# Patient Record
Sex: FEMALE | Race: WHITE | Employment: OTHER | URBAN - METROPOLITAN AREA
[De-identification: names, ages, dates, MRNs, and addresses within clinical notes are randomized per-mention and may not be internally consistent; named-entity substitution may affect disease eponyms.]

---

## 2017-02-06 ENCOUNTER — OFFICE VISIT (OUTPATIENT)
Dept: HEMATOLOGY/ONCOLOGY | Facility: HOSPITAL | Age: 62
End: 2017-02-06
Attending: INTERNAL MEDICINE
Payer: COMMERCIAL

## 2017-02-06 VITALS
BODY MASS INDEX: 36.29 KG/M2 | SYSTOLIC BLOOD PRESSURE: 134 MMHG | WEIGHT: 207.38 LBS | HEART RATE: 87 BPM | HEIGHT: 63.5 IN | OXYGEN SATURATION: 99 % | RESPIRATION RATE: 18 BRPM | DIASTOLIC BLOOD PRESSURE: 82 MMHG | TEMPERATURE: 99 F

## 2017-02-06 DIAGNOSIS — R91.1 INCIDENTAL PULMONARY NODULE, > 3MM AND < 8MM: ICD-10-CM

## 2017-02-06 DIAGNOSIS — I26.99 PULMONARY EMBOLISM ON RIGHT (HCC): Primary | ICD-10-CM

## 2017-02-06 LAB
ALBUMIN SERPL-MCNC: 3.9 G/DL (ref 3.5–4.8)
ALP LIVER SERPL-CCNC: 99 U/L (ref 46–118)
ALT SERPL-CCNC: 25 U/L (ref 14–54)
AST SERPL-CCNC: 12 U/L (ref 15–41)
BASOPHILS # BLD AUTO: 0.05 X10(3) UL (ref 0–0.1)
BASOPHILS NFR BLD AUTO: 0.8 %
BILIRUB SERPL-MCNC: 0.3 MG/DL (ref 0.1–2)
BUN BLD-MCNC: 20 MG/DL (ref 8–20)
CALCIUM BLD-MCNC: 9.8 MG/DL (ref 8.3–10.3)
CHLORIDE: 108 MMOL/L (ref 101–111)
CO2: 27 MMOL/L (ref 22–32)
CREAT BLD-MCNC: 0.74 MG/DL (ref 0.55–1.02)
EOSINOPHIL # BLD AUTO: 0.22 X10(3) UL (ref 0–0.3)
EOSINOPHIL NFR BLD AUTO: 3.4 %
ERYTHROCYTE [DISTWIDTH] IN BLOOD BY AUTOMATED COUNT: 14.5 % (ref 11.5–16)
GLUCOSE BLD-MCNC: 97 MG/DL (ref 70–99)
HCT VFR BLD AUTO: 40.5 % (ref 34–50)
HGB BLD-MCNC: 12.9 G/DL (ref 12–16)
IMMATURE GRANULOCYTE COUNT: 0.02 X10(3) UL (ref 0–1)
IMMATURE GRANULOCYTE RATIO %: 0.3 %
LYMPHOCYTES # BLD AUTO: 2.27 X10(3) UL (ref 0.9–4)
LYMPHOCYTES NFR BLD AUTO: 34.6 %
M PROTEIN MFR SERPL ELPH: 8.1 G/DL (ref 6.1–8.3)
MCH RBC QN AUTO: 28.9 PG (ref 27–33.2)
MCHC RBC AUTO-ENTMCNC: 31.9 G/DL (ref 31–37)
MCV RBC AUTO: 90.6 FL (ref 81–100)
MONOCYTES # BLD AUTO: 0.52 X10(3) UL (ref 0.1–0.6)
MONOCYTES NFR BLD AUTO: 7.9 %
NEUTROPHIL ABS PRELIM: 3.48 X10 (3) UL (ref 1.3–6.7)
NEUTROPHILS # BLD AUTO: 3.48 X10(3) UL (ref 1.3–6.7)
NEUTROPHILS NFR BLD AUTO: 53 %
PLATELET # BLD AUTO: 230 10(3)UL (ref 150–450)
POTASSIUM SERPL-SCNC: 4.2 MMOL/L (ref 3.6–5.1)
RBC # BLD AUTO: 4.47 X10(6)UL (ref 3.8–5.1)
RED CELL DISTRIBUTION WIDTH-SD: 47.9 FL (ref 35.1–46.3)
SODIUM SERPL-SCNC: 139 MMOL/L (ref 136–144)
WBC # BLD AUTO: 6.6 X10(3) UL (ref 4–13)

## 2017-02-06 PROCEDURE — 99214 OFFICE O/P EST MOD 30 MIN: CPT | Performed by: INTERNAL MEDICINE

## 2017-02-06 NOTE — PATIENT INSTRUCTIONS
For Dr. Matt Liu nurse line, call [phone] with any questions or concerns Monday through Friday 8:00 to 4:30. After hours or weekends for emergent needs [phone].

## 2017-02-06 NOTE — PROGRESS NOTES
Patient here for MD f/u. Last seen in May 2016. Taking eliquis as directed. Denies abnormal bleeding or bruising, tolerating well. C/O fatigue and generalized weakness.   States needs to f/u with PCP to adjust her synthroid dose, feels fatigued b/c of t

## 2017-02-06 NOTE — PROGRESS NOTES
Hematology Clinic Follow Up Note    Patient Name: Ronit Trejo  Medical Record Number: [id number]    YOB: 1955    PCP: Dr. Ingris Wyatt    Reason for Consultation:  Ronit Trejo was seen today for the diagnosis of pulmonary embolis Disp:  Rfl:      Allergies:   No Known Allergies    Psychosocial History:    Social History   Marital Status:   Spouse Name: N/A    Years of Education: N/A  Number of Children: N/A     Occupational History   [employer]      Social History Main Topic Results  Component Value Date   WBC 6.6 02/06/2017   WBC 6.8 05/18/2016   WBC 7.6 10/16/2015   HGB 12.9 02/06/2017   HGB 13.0 05/18/2016   HGB 12.1 10/16/2015   HCT 40.5 02/06/2017   MCV 90.6 02/06/2017   MCH 28.9 02/06/2017   MCHC 31.9 02/06/2017   RDW 14 calcified isthmus nodule could be further assessed with thyroid ultrasound as clinically appropriate. Impression & Plan:     *pulmonary embolism  -unprovoked, idiopathic.   Thrombophilia evaluation was negative.    -we have previously discussed that give

## 2017-06-29 ENCOUNTER — HOSPITAL ENCOUNTER (OUTPATIENT)
Dept: CT IMAGING | Facility: HOSPITAL | Age: 62
Discharge: HOME | End: 2017-06-29
Attending: INTERNAL MEDICINE
Payer: COMMERCIAL

## 2017-06-29 DIAGNOSIS — R91.1 INCIDENTAL PULMONARY NODULE, > 3MM AND < 8MM: ICD-10-CM

## 2017-06-29 PROCEDURE — 71260 CT THORAX DX C+: CPT | Performed by: INTERNAL MEDICINE

## 2017-06-30 ENCOUNTER — TELEPHONE (OUTPATIENT)
Dept: HEMATOLOGY/ONCOLOGY | Facility: HOSPITAL | Age: 62
End: 2017-06-30

## 2017-06-30 NOTE — TELEPHONE ENCOUNTER
Per MD:  Please call pt and let her know that her CT scan looks great, no concerns.  I will discuss further with her at her visit with me next week. Pt aware and verbalized understanding.

## 2017-07-06 ENCOUNTER — OFFICE VISIT (OUTPATIENT)
Dept: HEMATOLOGY/ONCOLOGY | Facility: HOSPITAL | Age: 62
End: 2017-07-06
Attending: INTERNAL MEDICINE
Payer: COMMERCIAL

## 2017-07-06 VITALS
RESPIRATION RATE: 18 BRPM | SYSTOLIC BLOOD PRESSURE: 116 MMHG | BODY MASS INDEX: 38.56 KG/M2 | TEMPERATURE: 97 F | HEIGHT: 63.5 IN | HEART RATE: 59 BPM | OXYGEN SATURATION: 100 % | WEIGHT: 220.38 LBS | DIASTOLIC BLOOD PRESSURE: 65 MMHG

## 2017-07-06 DIAGNOSIS — Z79.01 CHRONIC ANTICOAGULATION: ICD-10-CM

## 2017-07-06 DIAGNOSIS — I26.99 PULMONARY EMBOLISM ON RIGHT (HCC): Primary | ICD-10-CM

## 2017-07-06 DIAGNOSIS — R91.1 INCIDENTAL PULMONARY NODULE, > 3MM AND < 8MM: ICD-10-CM

## 2017-07-06 LAB
ALBUMIN SERPL-MCNC: 3.7 G/DL (ref 3.5–4.8)
ALP LIVER SERPL-CCNC: 90 U/L (ref 46–118)
ALT SERPL-CCNC: 22 U/L (ref 14–54)
AST SERPL-CCNC: 11 U/L (ref 15–41)
BASOPHILS # BLD AUTO: 0.08 X10(3) UL (ref 0–0.1)
BASOPHILS NFR BLD AUTO: 1.1 %
BILIRUB SERPL-MCNC: 0.3 MG/DL (ref 0.1–2)
BUN BLD-MCNC: 22 MG/DL (ref 8–20)
CALCIUM BLD-MCNC: 9.6 MG/DL (ref 8.3–10.3)
CHLORIDE: 110 MMOL/L (ref 101–111)
CO2: 25 MMOL/L (ref 22–32)
CREAT BLD-MCNC: 0.82 MG/DL (ref 0.55–1.02)
EOSINOPHIL # BLD AUTO: 0.23 X10(3) UL (ref 0–0.3)
EOSINOPHIL NFR BLD AUTO: 3.2 %
ERYTHROCYTE [DISTWIDTH] IN BLOOD BY AUTOMATED COUNT: 14.6 % (ref 11.5–16)
GLUCOSE BLD-MCNC: 89 MG/DL (ref 70–99)
HCT VFR BLD AUTO: 38.6 % (ref 34–50)
HGB BLD-MCNC: 12.4 G/DL (ref 12–16)
IMMATURE GRANULOCYTE COUNT: 0.03 X10(3) UL (ref 0–1)
IMMATURE GRANULOCYTE RATIO %: 0.4 %
LYMPHOCYTES # BLD AUTO: 2.53 X10(3) UL (ref 0.9–4)
LYMPHOCYTES NFR BLD AUTO: 35.5 %
M PROTEIN MFR SERPL ELPH: 8.1 G/DL (ref 6.1–8.3)
MCH RBC QN AUTO: 28.1 PG (ref 27–33.2)
MCHC RBC AUTO-ENTMCNC: 32.1 G/DL (ref 31–37)
MCV RBC AUTO: 87.3 FL (ref 81–100)
MONOCYTES # BLD AUTO: 0.48 X10(3) UL (ref 0.1–0.6)
MONOCYTES NFR BLD AUTO: 6.7 %
NEUTROPHIL ABS PRELIM: 3.77 X10 (3) UL (ref 1.3–6.7)
NEUTROPHILS # BLD AUTO: 3.77 X10(3) UL (ref 1.3–6.7)
NEUTROPHILS NFR BLD AUTO: 53.1 %
PLATELET # BLD AUTO: 215 10(3)UL (ref 150–450)
POTASSIUM SERPL-SCNC: 4.2 MMOL/L (ref 3.6–5.1)
RBC # BLD AUTO: 4.42 X10(6)UL (ref 3.8–5.1)
RED CELL DISTRIBUTION WIDTH-SD: 46.9 FL (ref 35.1–46.3)
SODIUM SERPL-SCNC: 141 MMOL/L (ref 136–144)
WBC # BLD AUTO: 7.1 X10(3) UL (ref 4–13)

## 2017-07-06 PROCEDURE — 99214 OFFICE O/P EST MOD 30 MIN: CPT | Performed by: INTERNAL MEDICINE

## 2017-07-06 NOTE — PATIENT INSTRUCTIONS
For Dr. Edmund Molina nurse line, call [phone] with any questions or concerns Monday through Friday 8:00 to 4:30. After hours or weekends for emergent needs [phone].

## 2017-07-06 NOTE — PROGRESS NOTES
Patient here for MD f/u. C/O constant fatigue, denies SOB. Occasional pain under left breast.  Taking eliquis daily. Denies abnormal bleeding/bruising.

## 2017-07-06 NOTE — PROGRESS NOTES
Hematology Clinic Follow Up Note    Patient Name: Estephania Knight  Medical Record Number: [id number]    YOB: 1955    PCP: Dr. Lele Pro    Reason for Consultation:  Estephania Knight was seen today for the diagnosis of pulmonary embolis children: N/A     Occupational History   [employer]      Social History Main Topics   Smoking status: Former Smoker  0.25 Packs/day  For 45.00 Years     Quit date: 9/23/2015    Smokeless tobacco: Never Used    Alcohol use Yes  0.0 oz/week     Comment: jane 07/06/2017   .0 07/06/2017   .0 02/06/2017   .0 05/18/2016       Lab Results  Component Value Date   GLU 89 07/06/2017   BUN 22 (H) 07/06/2017   CREATSERUM 0.82 07/06/2017   CREATSERUM 0.74 02/06/2017   CREATSERUM 0.73 05/18/2016   GFR for the first year; 5 percent per year thereafter). Further we discussed the risk for a major bleed on anticoagulation is less than 1% per year.   I advised that based on the risk:benefit ratio, that anticoagulation should be continued indefinitely at this

## 2018-01-04 ENCOUNTER — OFFICE VISIT (OUTPATIENT)
Dept: HEMATOLOGY/ONCOLOGY | Facility: HOSPITAL | Age: 63
End: 2018-01-04
Attending: INTERNAL MEDICINE
Payer: COMMERCIAL

## 2018-01-04 VITALS
OXYGEN SATURATION: 99 % | DIASTOLIC BLOOD PRESSURE: 71 MMHG | RESPIRATION RATE: 18 BRPM | TEMPERATURE: 98 F | HEIGHT: 63.5 IN | BODY MASS INDEX: 43.43 KG/M2 | SYSTOLIC BLOOD PRESSURE: 114 MMHG | WEIGHT: 248.19 LBS | HEART RATE: 61 BPM

## 2018-01-04 DIAGNOSIS — I26.99 PULMONARY EMBOLISM ON RIGHT (HCC): Primary | ICD-10-CM

## 2018-01-04 DIAGNOSIS — Z79.01 CHRONIC ANTICOAGULATION: ICD-10-CM

## 2018-01-04 LAB
ALBUMIN SERPL-MCNC: 3.7 G/DL (ref 3.5–4.8)
ALP LIVER SERPL-CCNC: 75 U/L (ref 46–118)
ALT SERPL-CCNC: 39 U/L (ref 14–54)
AST SERPL-CCNC: 27 U/L (ref 15–41)
BASOPHILS # BLD AUTO: 0.05 X10(3) UL (ref 0–0.1)
BASOPHILS NFR BLD AUTO: 0.8 %
BILIRUB SERPL-MCNC: 0.3 MG/DL (ref 0.1–2)
BUN BLD-MCNC: 17 MG/DL (ref 8–20)
CALCIUM BLD-MCNC: 9.4 MG/DL (ref 8.3–10.3)
CHLORIDE: 107 MMOL/L (ref 101–111)
CO2: 27 MMOL/L (ref 22–32)
CREAT BLD-MCNC: 0.71 MG/DL (ref 0.55–1.02)
EOSINOPHIL # BLD AUTO: 0.26 X10(3) UL (ref 0–0.3)
EOSINOPHIL NFR BLD AUTO: 4.3 %
ERYTHROCYTE [DISTWIDTH] IN BLOOD BY AUTOMATED COUNT: 14.2 % (ref 11.5–16)
GLUCOSE BLD-MCNC: 100 MG/DL (ref 70–99)
HCT VFR BLD AUTO: 37.8 % (ref 34–50)
HGB BLD-MCNC: 12.1 G/DL (ref 12–16)
IMMATURE GRANULOCYTE COUNT: 0.03 X10(3) UL (ref 0–1)
IMMATURE GRANULOCYTE RATIO %: 0.5 %
LYMPHOCYTES # BLD AUTO: 2.22 X10(3) UL (ref 0.9–4)
LYMPHOCYTES NFR BLD AUTO: 36.8 %
M PROTEIN MFR SERPL ELPH: 7.7 G/DL (ref 6.1–8.3)
MCH RBC QN AUTO: 28.5 PG (ref 27–33.2)
MCHC RBC AUTO-ENTMCNC: 32 G/DL (ref 31–37)
MCV RBC AUTO: 89.2 FL (ref 81–100)
MONOCYTES # BLD AUTO: 0.61 X10(3) UL (ref 0.1–0.6)
MONOCYTES NFR BLD AUTO: 10.1 %
NEUTROPHIL ABS PRELIM: 2.86 X10 (3) UL (ref 1.3–6.7)
NEUTROPHILS # BLD AUTO: 2.86 X10(3) UL (ref 1.3–6.7)
NEUTROPHILS NFR BLD AUTO: 47.5 %
PLATELET # BLD AUTO: 208 10(3)UL (ref 150–450)
POTASSIUM SERPL-SCNC: 4.3 MMOL/L (ref 3.6–5.1)
RBC # BLD AUTO: 4.24 X10(6)UL (ref 3.8–5.1)
RED CELL DISTRIBUTION WIDTH-SD: 46.2 FL (ref 35.1–46.3)
SODIUM SERPL-SCNC: 139 MMOL/L (ref 136–144)
WBC # BLD AUTO: 6 X10(3) UL (ref 4–13)

## 2018-01-04 PROCEDURE — 99214 OFFICE O/P EST MOD 30 MIN: CPT | Performed by: INTERNAL MEDICINE

## 2018-01-04 RX ORDER — GABAPENTIN 800 MG/1
800 TABLET ORAL 3 TIMES DAILY
COMMUNITY

## 2018-01-04 RX ORDER — OXYCODONE AND ACETAMINOPHEN 10; 325 MG/1; MG/1
1 TABLET ORAL EVERY 8 HOURS PRN
COMMUNITY

## 2018-01-04 NOTE — PROGRESS NOTES
Patient here for 6 month MD f/u. C/O increased back pain, back on Norco for.   Occasional SOB, resolves with rest.

## 2018-01-04 NOTE — PROGRESS NOTES
Hematology Clinic Follow Up Note    Patient Name: Denia Clark  Medical Record Number: [id number]    YOB: 1955    PCP: Dr. Josephina Canavan    Reason for Consultation:  Denia Clark was seen today for the diagnosis of pulmonary embolis (two) times daily. Disp: 180 tablet Rfl: 3   Levothyroxine Sodium (SYNTHROID) 150 MCG Oral Tab 175 mcg  Disp:  Rfl: 0   Meclizine HCl (ANTIVERT) 25 MG Oral Tab Take 1 tablet by mouth 4 (four) times daily as needed for Dizziness.  Disp: 20 tablet Rfl: 0 normoactive bowel sounds, no hepatosplenomegaly  Neurological: Grossly intact   Lymphatics: No palpable lymphadenopathy  Skin: no rashes or petechiae    Laboratory:    Lab Results  Component Value Date   WBC 6.0 01/04/2018   WBC 7.1 07/06/2017   WBC 6.6 02 for 3 days prior, the day of, and the day after the procedure. (hold starting 1/7, resume on 1/12).      RTC in 6 months with labs     Tonya Cabrera MD  Hematology/Medical [address] [place] [address]

## 2018-01-04 NOTE — PATIENT INSTRUCTIONS
For Dr. Mccollum Mealing nurse line, call [phone] with any questions or concerns,  Monday through Friday 8:00 to 4:30. After hours or weekends for urgent needs: [phone].   Central [phone]  Medical Records:   [phone]  [facility]

## 2018-04-15 DIAGNOSIS — I26.99 PULMONARY EMBOLISM ON RIGHT (HCC): ICD-10-CM

## 2018-04-16 RX ORDER — APIXABAN 2.5 MG/1
2.5 TABLET, FILM COATED ORAL 2 TIMES DAILY
Qty: 180 TABLET | Refills: 2 | Status: SHIPPED | OUTPATIENT
Start: 2018-04-16 | End: 2019-04-22

## 2018-04-26 ENCOUNTER — TELEPHONE (OUTPATIENT)
Dept: HEMATOLOGY/ONCOLOGY | Facility: HOSPITAL | Age: 63
End: 2018-04-26

## 2018-04-26 NOTE — TELEPHONE ENCOUNTER
MD Mahesh Bustillo, RN   Caller: Unspecified (Today,  1:09 PM)             Please advise to hold for 3 days prior, the day of, and for 3 days after her procedure.  May have to delay restarting it further if there is any surgical complica

## 2018-04-26 NOTE — TELEPHONE ENCOUNTER
Patient having surgery next week for an ankle tear repair. Patient wanting to know when it is safe to stop the Eliquis prior to the surgery and when to resume it?

## 2018-06-07 ENCOUNTER — MED REC SCAN ONLY (OUTPATIENT)
Dept: HEMATOLOGY/ONCOLOGY | Facility: HOSPITAL | Age: 63
End: 2018-06-07

## 2018-06-19 ENCOUNTER — NURSE ONLY (OUTPATIENT)
Dept: HEMATOLOGY/ONCOLOGY | Facility: HOSPITAL | Age: 63
End: 2018-06-19

## 2018-06-19 NOTE — PROGRESS NOTES
Anticoagulant holding letter re-faxed to [place] from [facility].    Faxed to [phone]

## 2018-07-09 ENCOUNTER — MED REC SCAN ONLY (OUTPATIENT)
Dept: HEMATOLOGY/ONCOLOGY | Facility: HOSPITAL | Age: 63
End: 2018-07-09

## 2019-04-22 ENCOUNTER — OFFICE VISIT (OUTPATIENT)
Dept: HEMATOLOGY/ONCOLOGY | Facility: HOSPITAL | Age: 64
End: 2019-04-22
Attending: INTERNAL MEDICINE
Payer: COMMERCIAL

## 2019-04-22 VITALS
DIASTOLIC BLOOD PRESSURE: 74 MMHG | OXYGEN SATURATION: 96 % | HEART RATE: 73 BPM | SYSTOLIC BLOOD PRESSURE: 113 MMHG | BODY MASS INDEX: 46 KG/M2 | TEMPERATURE: 98 F | WEIGHT: 265 LBS | RESPIRATION RATE: 18 BRPM

## 2019-04-22 DIAGNOSIS — R74.01 TRANSAMINITIS: ICD-10-CM

## 2019-04-22 DIAGNOSIS — I26.99 PULMONARY EMBOLISM ON RIGHT (HCC): Primary | ICD-10-CM

## 2019-04-22 DIAGNOSIS — Z79.01 CHRONIC ANTICOAGULATION: ICD-10-CM

## 2019-04-22 PROCEDURE — 99214 OFFICE O/P EST MOD 30 MIN: CPT | Performed by: INTERNAL MEDICINE

## 2019-04-22 RX ORDER — OMEPRAZOLE 20 MG/1
20 TABLET, DELAYED RELEASE ORAL DAILY
COMMUNITY

## 2019-04-22 NOTE — PROGRESS NOTES
Hematology Clinic Follow Up Note    Patient Name: Love Bangura  Medical Record Number: [id number]    YOB: 1955    PCP: Dr. Rosenberg Figures    Reason for Consultation:  Love Bangura was seen today for the diagnosis of pulmonary embolis (eight) hours as needed for Pain. Disp:  Rfl:    Levothyroxine Sodium (SYNTHROID) 150 MCG Oral Tab 175 mcg  Disp:  Rfl: 0   Meclizine HCl (ANTIVERT) 25 MG Oral Tab Take 1 tablet by mouth 4 (four) times daily as needed for Dizziness.  Disp: 20 tablet Rfl: 0 petechiae    Laboratory:  Lab Results   Component Value Date    WBC 5.7 04/22/2019    WBC 6.0 01/04/2018    WBC 7.1 07/06/2017    HGB 12.9 04/22/2019    HGB 12.1 01/04/2018    HGB 12.4 07/06/2017    HCT 39.3 04/22/2019    MCV 89.1 04/22/2019    MCH 29.3 04 likely related to weight gain. I encouraged her to work on weight loss and exercise. -repeat CMP in 6 months    *weight gain  -she feels this is due to her inability to exercise due to back and joint pains.   I encouraged her to try water aerobics or oth

## 2019-04-22 NOTE — PROGRESS NOTES
Education Record    Learner:  Patient and Family Member    Disease / Diagnosis:PE    Barriers / Limitations:  None   Comments:    Method:  Brief focused   Comments:    General Topics:  Plan of care reviewed   Comments:    Outcome:  Shows understanding   Co

## 2019-04-23 PROBLEM — M47.812 CERVICAL SPINE DEGENERATION: Status: ACTIVE | Noted: 2018-10-29

## 2019-04-23 PROBLEM — R74.01 TRANSAMINITIS: Status: ACTIVE | Noted: 2019-04-23

## 2019-07-19 ENCOUNTER — TELEPHONE (OUTPATIENT)
Dept: HEMATOLOGY/ONCOLOGY | Facility: HOSPITAL | Age: 64
End: 2019-07-19

## 2019-07-19 NOTE — TELEPHONE ENCOUNTER
Office called LVM that they faxed hold of eliquis request on 7/2 and have not received form back. Pt has upcoming procedure. Called office back to give verbal from MD hold 72 hrs before and 2 days after. Office phone just rang and rang not able to LVM.

## 2020-01-13 ENCOUNTER — TELEPHONE (OUTPATIENT)
Dept: HEMATOLOGY/ONCOLOGY | Facility: HOSPITAL | Age: 65
End: 2020-01-13

## 2020-01-13 DIAGNOSIS — I26.99 PULMONARY EMBOLISM ON RIGHT (HCC): ICD-10-CM

## 2020-01-13 NOTE — TELEPHONE ENCOUNTER
Called M for pt to call back office. Received refill request for eliquis from pharmacy. Was due back to see MD in Oct. 2019. Will refill for 30day supply but pt needs to make f/up apt.

## 2020-02-19 ENCOUNTER — TELEPHONE (OUTPATIENT)
Dept: HEMATOLOGY/ONCOLOGY | Facility: HOSPITAL | Age: 65
End: 2020-02-19

## 2020-02-19 DIAGNOSIS — I26.99 PULMONARY EMBOLISM ON RIGHT (HCC): ICD-10-CM

## 2020-02-19 NOTE — TELEPHONE ENCOUNTER
received fax from pharmacy for refill of eliquis. Have left pt multiple message about being late for f/up and only filling meds for 1 month. She was due for f/up in 10/2019. Told pt she needs to have apt torsten before can refill medication. She v/u.  Henrique Painting

## 2020-02-24 ENCOUNTER — APPOINTMENT (OUTPATIENT)
Dept: HEMATOLOGY/ONCOLOGY | Facility: HOSPITAL | Age: 65
End: 2020-02-24
Attending: INTERNAL MEDICINE
Payer: COMMERCIAL

## 2020-03-05 ENCOUNTER — OFFICE VISIT (OUTPATIENT)
Dept: HEMATOLOGY/ONCOLOGY | Facility: HOSPITAL | Age: 65
End: 2020-03-05
Attending: INTERNAL MEDICINE
Payer: COMMERCIAL

## 2020-03-05 VITALS
SYSTOLIC BLOOD PRESSURE: 129 MMHG | HEIGHT: 63.5 IN | DIASTOLIC BLOOD PRESSURE: 74 MMHG | TEMPERATURE: 98 F | WEIGHT: 272 LBS | HEART RATE: 60 BPM | BODY MASS INDEX: 47.6 KG/M2 | RESPIRATION RATE: 16 BRPM | OXYGEN SATURATION: 96 %

## 2020-03-05 DIAGNOSIS — R74.01 TRANSAMINITIS: ICD-10-CM

## 2020-03-05 DIAGNOSIS — Z79.01 CHRONIC ANTICOAGULATION: ICD-10-CM

## 2020-03-05 DIAGNOSIS — I26.99 PULMONARY EMBOLISM ON RIGHT (HCC): Primary | ICD-10-CM

## 2020-03-05 LAB
ALBUMIN SERPL-MCNC: 3.5 G/DL (ref 3.4–5)
ALBUMIN/GLOB SERPL: 0.9 {RATIO} (ref 1–2)
ALP LIVER SERPL-CCNC: 94 U/L (ref 50–130)
ALT SERPL-CCNC: 93 U/L (ref 13–56)
ANION GAP SERPL CALC-SCNC: 3 MMOL/L (ref 0–18)
AST SERPL-CCNC: 50 U/L (ref 15–37)
BASOPHILS # BLD AUTO: 0.05 X10(3) UL (ref 0–0.2)
BASOPHILS NFR BLD AUTO: 0.8 %
BILIRUB SERPL-MCNC: 0.3 MG/DL (ref 0.1–2)
BUN BLD-MCNC: 12 MG/DL (ref 7–18)
BUN/CREAT SERPL: 13.6 (ref 10–20)
CALCIUM BLD-MCNC: 9.4 MG/DL (ref 8.5–10.1)
CHLORIDE SERPL-SCNC: 107 MMOL/L (ref 98–112)
CO2 SERPL-SCNC: 27 MMOL/L (ref 21–32)
CREAT BLD-MCNC: 0.88 MG/DL (ref 0.55–1.02)
DEPRECATED RDW RBC AUTO: 46.1 FL (ref 35.1–46.3)
EOSINOPHIL # BLD AUTO: 0.17 X10(3) UL (ref 0–0.7)
EOSINOPHIL NFR BLD AUTO: 2.8 %
ERYTHROCYTE [DISTWIDTH] IN BLOOD BY AUTOMATED COUNT: 14.4 % (ref 11–15)
GLOBULIN PLAS-MCNC: 4.1 G/DL (ref 2.8–4.4)
GLUCOSE BLD-MCNC: 94 MG/DL (ref 70–99)
HCT VFR BLD AUTO: 39 % (ref 35–48)
HGB BLD-MCNC: 12.6 G/DL (ref 12–16)
IMM GRANULOCYTES # BLD AUTO: 0.03 X10(3) UL (ref 0–1)
IMM GRANULOCYTES NFR BLD: 0.5 %
LYMPHOCYTES # BLD AUTO: 2.43 X10(3) UL (ref 1–4)
LYMPHOCYTES NFR BLD AUTO: 40.2 %
M PROTEIN MFR SERPL ELPH: 7.6 G/DL (ref 6.4–8.2)
MCH RBC QN AUTO: 28.5 PG (ref 26–34)
MCHC RBC AUTO-ENTMCNC: 32.3 G/DL (ref 31–37)
MCV RBC AUTO: 88.2 FL (ref 80–100)
MONOCYTES # BLD AUTO: 0.47 X10(3) UL (ref 0.1–1)
MONOCYTES NFR BLD AUTO: 7.8 %
NEUTROPHILS # BLD AUTO: 2.9 X10 (3) UL (ref 1.5–7.7)
NEUTROPHILS # BLD AUTO: 2.9 X10(3) UL (ref 1.5–7.7)
NEUTROPHILS NFR BLD AUTO: 47.9 %
OSMOLALITY SERPL CALC.SUM OF ELEC: 284 MOSM/KG (ref 275–295)
PLATELET # BLD AUTO: 199 10(3)UL (ref 150–450)
POTASSIUM SERPL-SCNC: 4.3 MMOL/L (ref 3.5–5.1)
RBC # BLD AUTO: 4.42 X10(6)UL (ref 3.8–5.3)
SODIUM SERPL-SCNC: 137 MMOL/L (ref 136–145)
WBC # BLD AUTO: 6.1 X10(3) UL (ref 4–11)

## 2020-03-05 PROCEDURE — 99214 OFFICE O/P EST MOD 30 MIN: CPT | Performed by: INTERNAL MEDICINE

## 2020-03-05 NOTE — PROGRESS NOTES
Education Record    Learner:  Patient    Disease / Diagnosis:PE    Barriers / Limitations:  None   Comments:    Method:  Brief focused   Comments:    General Topics:  Plan of care reviewed   Comments:    Outcome:  Shows understanding   Comments:here for la

## 2020-03-05 NOTE — PROGRESS NOTES
Hematology Clinic Follow Up Note    Patient Name: Love Bangura  Medical Record Number: [id number]    YOB: 1955    PCP: Dr. Chet Chan    Reason for Consultation:  Love Bangura was seen today for the diagnosis of pulmonary embolis (eight) hours as needed for Pain., Disp: , Rfl:   Levothyroxine Sodium (SYNTHROID) 150 MCG Oral Tab, 175 mcg , Disp: , Rfl: 0  Meclizine HCl (ANTIVERT) 25 MG Oral Tab, Take 1 tablet by mouth 4 (four) times daily as needed for Dizziness. , Disp: 20 tablet, R normoactive bowel sounds, no hepatosplenomegaly  Neurological: Grossly intact   Lymphatics: No palpable lymphadenopathy  Skin: no rashes or petechiae  Ext: trace symmetric BLE edema    Laboratory:  Lab Results   Component Value Date    WBC 6.1 03/05/2020

## 2020-12-14 DIAGNOSIS — I26.99 PULMONARY EMBOLISM ON RIGHT (HCC): ICD-10-CM

## 2020-12-14 RX ORDER — APIXABAN 2.5 MG/1
TABLET, FILM COATED ORAL
Qty: 60 TABLET | Refills: 0 | Status: SHIPPED | OUTPATIENT
Start: 2020-12-14 | End: 2021-02-08

## 2021-02-08 DIAGNOSIS — I26.99 PULMONARY EMBOLISM ON RIGHT (HCC): ICD-10-CM

## 2021-02-08 NOTE — TELEPHONE ENCOUNTER
Rob Andujar calling to make f/u appt and request refill on ELIQUIS 2.5 MG Oral Tab.  Thank you ortega

## 2021-02-15 ENCOUNTER — APPOINTMENT (OUTPATIENT)
Dept: HEMATOLOGY/ONCOLOGY | Facility: HOSPITAL | Age: 66
End: 2021-02-15
Attending: INTERNAL MEDICINE
Payer: COMMERCIAL

## 2021-02-25 ENCOUNTER — OFFICE VISIT (OUTPATIENT)
Dept: HEMATOLOGY/ONCOLOGY | Facility: HOSPITAL | Age: 66
End: 2021-02-25
Attending: INTERNAL MEDICINE
Payer: MEDICARE

## 2021-02-25 VITALS
DIASTOLIC BLOOD PRESSURE: 87 MMHG | SYSTOLIC BLOOD PRESSURE: 146 MMHG | TEMPERATURE: 98 F | RESPIRATION RATE: 18 BRPM | WEIGHT: 285 LBS | HEART RATE: 72 BPM | BODY MASS INDEX: 50 KG/M2 | OXYGEN SATURATION: 94 %

## 2021-02-25 DIAGNOSIS — Z79.01 PULMONARY EMBOLISM ON LONG-TERM ANTICOAGULATION THERAPY (HCC): Primary | ICD-10-CM

## 2021-02-25 DIAGNOSIS — I26.99 PULMONARY EMBOLISM ON RIGHT (HCC): ICD-10-CM

## 2021-02-25 DIAGNOSIS — I26.99 PULMONARY EMBOLISM ON LONG-TERM ANTICOAGULATION THERAPY (HCC): Primary | ICD-10-CM

## 2021-02-25 DIAGNOSIS — Z79.01 CHRONIC ANTICOAGULATION: ICD-10-CM

## 2021-02-25 LAB
ALBUMIN SERPL-MCNC: 3.8 G/DL (ref 3.4–5)
ALBUMIN/GLOB SERPL: 0.9 {RATIO} (ref 1–2)
ALP LIVER SERPL-CCNC: 83 U/L
ALT SERPL-CCNC: 45 U/L
ANION GAP SERPL CALC-SCNC: 5 MMOL/L (ref 0–18)
AST SERPL-CCNC: 31 U/L (ref 15–37)
BASOPHILS # BLD AUTO: 0.06 X10(3) UL (ref 0–0.2)
BASOPHILS NFR BLD AUTO: 0.9 %
BILIRUB SERPL-MCNC: 0.3 MG/DL (ref 0.1–2)
BUN BLD-MCNC: 12 MG/DL (ref 7–18)
BUN/CREAT SERPL: 15.4 (ref 10–20)
CALCIUM BLD-MCNC: 9.6 MG/DL (ref 8.5–10.1)
CHLORIDE SERPL-SCNC: 106 MMOL/L (ref 98–112)
CO2 SERPL-SCNC: 27 MMOL/L (ref 21–32)
CREAT BLD-MCNC: 0.78 MG/DL
DEPRECATED RDW RBC AUTO: 48 FL (ref 35.1–46.3)
EOSINOPHIL # BLD AUTO: 0.2 X10(3) UL (ref 0–0.7)
EOSINOPHIL NFR BLD AUTO: 3.1 %
ERYTHROCYTE [DISTWIDTH] IN BLOOD BY AUTOMATED COUNT: 14.1 % (ref 11–15)
GLOBULIN PLAS-MCNC: 4.2 G/DL (ref 2.8–4.4)
GLUCOSE BLD-MCNC: 140 MG/DL (ref 70–99)
HCT VFR BLD AUTO: 40.4 %
HGB BLD-MCNC: 12.7 G/DL
IMM GRANULOCYTES # BLD AUTO: 0.03 X10(3) UL (ref 0–1)
IMM GRANULOCYTES NFR BLD: 0.5 %
LYMPHOCYTES # BLD AUTO: 2.17 X10(3) UL (ref 1–4)
LYMPHOCYTES NFR BLD AUTO: 34.1 %
M PROTEIN MFR SERPL ELPH: 8 G/DL (ref 6.4–8.2)
MCH RBC QN AUTO: 29.2 PG (ref 26–34)
MCHC RBC AUTO-ENTMCNC: 31.4 G/DL (ref 31–37)
MCV RBC AUTO: 92.9 FL
MONOCYTES # BLD AUTO: 0.58 X10(3) UL (ref 0.1–1)
MONOCYTES NFR BLD AUTO: 9.1 %
NEUTROPHILS # BLD AUTO: 3.32 X10 (3) UL (ref 1.5–7.7)
NEUTROPHILS # BLD AUTO: 3.32 X10(3) UL (ref 1.5–7.7)
NEUTROPHILS NFR BLD AUTO: 52.3 %
OSMOLALITY SERPL CALC.SUM OF ELEC: 288 MOSM/KG (ref 275–295)
PATIENT FASTING Y/N/NP: NO
PLATELET # BLD AUTO: 215 10(3)UL (ref 150–450)
POTASSIUM SERPL-SCNC: 4.5 MMOL/L (ref 3.5–5.1)
RBC # BLD AUTO: 4.35 X10(6)UL
SODIUM SERPL-SCNC: 138 MMOL/L (ref 136–145)
WBC # BLD AUTO: 6.4 X10(3) UL (ref 4–11)

## 2021-02-25 PROCEDURE — 99214 OFFICE O/P EST MOD 30 MIN: CPT | Performed by: INTERNAL MEDICINE

## 2021-02-25 NOTE — PROGRESS NOTES
Hematology Clinic Follow Up Note    Patient Name: Natalia Parikh  Medical Record Number: [id number]    YOB: 1955    PCP: Dr. Javid Ramirez    Reason for Consultation:  Natalia Parikh was seen today for the diagnosis of pulmonary embolis mouth 3 (three) times daily. , Disp: , Rfl:     •  oxyCODONE-acetaminophen  MG Oral Tab, Take 1 tablet by mouth every 8 (eight) hours as needed for Pain., Disp: , Rfl:     •  Levothyroxine Sodium (SYNTHROID) 150 MCG Oral Tab, 175 mcg , Disp: , Rfl: 0 sounds, no hepatosplenomegaly  Neurological: Grossly intact   Lymphatics: No palpable lymphadenopathy  Skin: no rashes or petechiae  Ext: trace symmetric BLE edema    Laboratory:  Lab Results   Component Value Date    WBC 6.4 02/25/2021    WBC 6.1 03/05/20

## 2021-02-25 NOTE — PROGRESS NOTES
Education Record    Learner:  Patient    Disease / Diagnosis:PE    Barriers / Limitations:  None   Comments:    Method:  Discussion   Comments:    General Topics:  Plan of care reviewed   Comments:    Outcome:  Shows understanding   Comments:last seen 1 ye

## 2021-02-26 PROBLEM — Z79.01 PULMONARY EMBOLISM ON LONG-TERM ANTICOAGULATION THERAPY (HCC): Status: ACTIVE | Noted: 2021-02-26

## 2021-02-26 PROBLEM — I26.99 PULMONARY EMBOLISM ON LONG-TERM ANTICOAGULATION THERAPY  (HCC): Status: ACTIVE | Noted: 2021-02-26

## 2021-02-26 PROBLEM — I26.99 PULMONARY EMBOLISM ON LONG-TERM ANTICOAGULATION THERAPY (HCC): Status: ACTIVE | Noted: 2021-02-26

## 2021-02-26 PROBLEM — I26.99 PULMONARY EMBOLISM ON LONG-TERM ANTICOAGULATION THERAPY: Status: ACTIVE | Noted: 2021-02-26

## 2021-02-26 PROBLEM — Z79.01 PULMONARY EMBOLISM ON LONG-TERM ANTICOAGULATION THERAPY: Status: ACTIVE | Noted: 2021-02-26

## 2021-02-26 PROBLEM — Z79.01 PULMONARY EMBOLISM ON LONG-TERM ANTICOAGULATION THERAPY  (HCC): Status: ACTIVE | Noted: 2021-02-26

## 2021-08-26 ENCOUNTER — OFFICE VISIT (OUTPATIENT)
Dept: HEMATOLOGY/ONCOLOGY | Facility: HOSPITAL | Age: 66
End: 2021-08-26
Attending: INTERNAL MEDICINE
Payer: MEDICARE

## 2021-08-26 VITALS
BODY MASS INDEX: 50 KG/M2 | DIASTOLIC BLOOD PRESSURE: 82 MMHG | RESPIRATION RATE: 18 BRPM | OXYGEN SATURATION: 97 % | SYSTOLIC BLOOD PRESSURE: 135 MMHG | WEIGHT: 284 LBS | HEART RATE: 80 BPM | TEMPERATURE: 98 F

## 2021-08-26 DIAGNOSIS — Z79.01 CHRONIC ANTICOAGULATION: ICD-10-CM

## 2021-08-26 DIAGNOSIS — Z71.89 COUNSELED ABOUT COVID-19 VIRUS INFECTION: ICD-10-CM

## 2021-08-26 DIAGNOSIS — Z79.01 PULMONARY EMBOLISM ON LONG-TERM ANTICOAGULATION THERAPY (HCC): Primary | ICD-10-CM

## 2021-08-26 DIAGNOSIS — I26.99 PULMONARY EMBOLISM ON RIGHT (HCC): ICD-10-CM

## 2021-08-26 DIAGNOSIS — I26.99 PULMONARY EMBOLISM ON LONG-TERM ANTICOAGULATION THERAPY (HCC): Primary | ICD-10-CM

## 2021-08-26 LAB
ALBUMIN SERPL-MCNC: 3.4 G/DL (ref 3.4–5)
ALBUMIN/GLOB SERPL: 0.8 {RATIO} (ref 1–2)
ALP LIVER SERPL-CCNC: 79 U/L
ALT SERPL-CCNC: 47 U/L
ANION GAP SERPL CALC-SCNC: 2 MMOL/L (ref 0–18)
AST SERPL-CCNC: 27 U/L (ref 15–37)
BASOPHILS # BLD AUTO: 0.08 X10(3) UL (ref 0–0.2)
BASOPHILS NFR BLD AUTO: 1.2 %
BILIRUB SERPL-MCNC: 0.2 MG/DL (ref 0.1–2)
BUN BLD-MCNC: 13 MG/DL (ref 7–18)
CALCIUM BLD-MCNC: 9.5 MG/DL (ref 8.5–10.1)
CHLORIDE SERPL-SCNC: 108 MMOL/L (ref 98–112)
CO2 SERPL-SCNC: 27 MMOL/L (ref 21–32)
CREAT BLD-MCNC: 0.92 MG/DL
EOSINOPHIL # BLD AUTO: 0.28 X10(3) UL (ref 0–0.7)
EOSINOPHIL NFR BLD AUTO: 4.1 %
ERYTHROCYTE [DISTWIDTH] IN BLOOD BY AUTOMATED COUNT: 14.4 %
GLOBULIN PLAS-MCNC: 4.3 G/DL (ref 2.8–4.4)
GLUCOSE BLD-MCNC: 110 MG/DL (ref 70–99)
HCT VFR BLD AUTO: 39.6 %
HGB BLD-MCNC: 12.7 G/DL
IMM GRANULOCYTES # BLD AUTO: 0.04 X10(3) UL (ref 0–1)
IMM GRANULOCYTES NFR BLD: 0.6 %
LYMPHOCYTES # BLD AUTO: 2.34 X10(3) UL (ref 1–4)
LYMPHOCYTES NFR BLD AUTO: 34.5 %
M PROTEIN MFR SERPL ELPH: 7.7 G/DL (ref 6.4–8.2)
MCH RBC QN AUTO: 29 PG (ref 26–34)
MCHC RBC AUTO-ENTMCNC: 32.1 G/DL (ref 31–37)
MCV RBC AUTO: 90.4 FL
MONOCYTES # BLD AUTO: 0.68 X10(3) UL (ref 0.1–1)
MONOCYTES NFR BLD AUTO: 10 %
NEUTROPHILS # BLD AUTO: 3.37 X10 (3) UL (ref 1.5–7.7)
NEUTROPHILS # BLD AUTO: 3.37 X10(3) UL (ref 1.5–7.7)
NEUTROPHILS NFR BLD AUTO: 49.6 %
OSMOLALITY SERPL CALC.SUM OF ELEC: 285 MOSM/KG (ref 275–295)
PATIENT FASTING Y/N/NP: NO
PLATELET # BLD AUTO: 236 10(3)UL (ref 150–450)
POTASSIUM SERPL-SCNC: 4.6 MMOL/L (ref 3.5–5.1)
RBC # BLD AUTO: 4.38 X10(6)UL
SODIUM SERPL-SCNC: 137 MMOL/L (ref 136–145)
WBC # BLD AUTO: 6.8 X10(3) UL (ref 4–11)

## 2021-08-26 PROCEDURE — 99215 OFFICE O/P EST HI 40 MIN: CPT | Performed by: INTERNAL MEDICINE

## 2021-08-26 NOTE — PROGRESS NOTES
Education Record     Learner:  Patient     Disease / Diagnosis:PE     Barriers / Limitations:  None                Comments:     Method:  Discussion                Comments:     General Topics:  Plan of care reviewed                Comments:     Outcome:

## 2021-08-26 NOTE — PROGRESS NOTES
Hematology Clinic Follow Up Note    Patient Name: Estephania Knight  Medical Record Number: [id number]   YOB: 1955    PCP: Dr. Lele Pro    Reason for Consultation:  Estephania Knight was seen today for the diagnosis of pulmonary embolism (PRILOSEC OTC) 20 MG Oral Tab EC, Take 20 mg by mouth daily. , Disp: , Rfl:   gabapentin 800 MG Oral Tab, Take 800 mg by mouth 3 (three) times daily. , Disp: , Rfl:   oxyCODONE-acetaminophen  MG Oral Tab, Take 1 tablet by mouth every 8 (eight) hours as murmurs  Respiratory: Lungs clear to auscultation bilaterally  GI/Abd: Soft, non-tender with normoactive bowel sounds, no hepatosplenomegaly  Neurological: Grossly intact   Lymphatics: No palpable lymphadenopathy  Skin: no rashes or petechiae  Ext: trace s far.  -Today we discussed at length the benefits and risks of the COVID-19 vaccination. Advised that I strongly recommend she get one of the mRNA based COVID-19 vaccines. We discussed that the benefits dramatically outweigh any risk.   We also discussed t

## 2022-02-28 ENCOUNTER — OFFICE VISIT (OUTPATIENT)
Dept: HEMATOLOGY/ONCOLOGY | Facility: HOSPITAL | Age: 67
End: 2022-02-28
Attending: INTERNAL MEDICINE
Payer: MEDICARE

## 2022-02-28 VITALS
HEART RATE: 55 BPM | BODY MASS INDEX: 49 KG/M2 | OXYGEN SATURATION: 96 % | WEIGHT: 283.5 LBS | SYSTOLIC BLOOD PRESSURE: 131 MMHG | DIASTOLIC BLOOD PRESSURE: 77 MMHG | RESPIRATION RATE: 18 BRPM | TEMPERATURE: 98 F

## 2022-02-28 DIAGNOSIS — I26.99 PULMONARY EMBOLISM ON RIGHT (HCC): ICD-10-CM

## 2022-02-28 DIAGNOSIS — Z79.01 PULMONARY EMBOLISM ON LONG-TERM ANTICOAGULATION THERAPY (HCC): Primary | ICD-10-CM

## 2022-02-28 DIAGNOSIS — Z79.01 CHRONIC ANTICOAGULATION: ICD-10-CM

## 2022-02-28 DIAGNOSIS — I26.99 PULMONARY EMBOLISM ON LONG-TERM ANTICOAGULATION THERAPY (HCC): Primary | ICD-10-CM

## 2022-02-28 LAB
ALBUMIN SERPL-MCNC: 3.6 G/DL (ref 3.4–5)
ALBUMIN/GLOB SERPL: 0.8 {RATIO} (ref 1–2)
ALP LIVER SERPL-CCNC: 75 U/L
ALT SERPL-CCNC: 34 U/L
ANION GAP SERPL CALC-SCNC: 3 MMOL/L (ref 0–18)
AST SERPL-CCNC: 23 U/L (ref 15–37)
BASOPHILS # BLD AUTO: 0.07 X10(3) UL (ref 0–0.2)
BASOPHILS NFR BLD AUTO: 1.1 %
BILIRUB SERPL-MCNC: 0.4 MG/DL (ref 0.1–2)
BUN BLD-MCNC: 9 MG/DL (ref 7–18)
CALCIUM BLD-MCNC: 9.9 MG/DL (ref 8.5–10.1)
CHLORIDE SERPL-SCNC: 107 MMOL/L (ref 98–112)
CO2 SERPL-SCNC: 29 MMOL/L (ref 21–32)
CREAT BLD-MCNC: 0.9 MG/DL
EOSINOPHIL # BLD AUTO: 0.17 X10(3) UL (ref 0–0.7)
EOSINOPHIL NFR BLD AUTO: 2.6 %
ERYTHROCYTE [DISTWIDTH] IN BLOOD BY AUTOMATED COUNT: 14.6 %
FASTING STATUS PATIENT QL REPORTED: NO
GLOBULIN PLAS-MCNC: 4.5 G/DL (ref 2.8–4.4)
GLUCOSE BLD-MCNC: 122 MG/DL (ref 70–99)
HCT VFR BLD AUTO: 39.3 %
HGB BLD-MCNC: 12.6 G/DL
IMM GRANULOCYTES # BLD AUTO: 0.06 X10(3) UL (ref 0–1)
IMM GRANULOCYTES NFR BLD: 0.9 %
LYMPHOCYTES # BLD AUTO: 2.15 X10(3) UL (ref 1–4)
LYMPHOCYTES NFR BLD AUTO: 33.1 %
MCH RBC QN AUTO: 28.6 PG (ref 26–34)
MCHC RBC AUTO-ENTMCNC: 32.1 G/DL (ref 31–37)
MCV RBC AUTO: 89.3 FL
MONOCYTES # BLD AUTO: 0.67 X10(3) UL (ref 0.1–1)
MONOCYTES NFR BLD AUTO: 10.3 %
NEUTROPHILS # BLD AUTO: 3.38 X10 (3) UL (ref 1.5–7.7)
NEUTROPHILS # BLD AUTO: 3.38 X10(3) UL (ref 1.5–7.7)
NEUTROPHILS NFR BLD AUTO: 52 %
OSMOLALITY SERPL CALC.SUM OF ELEC: 288 MOSM/KG (ref 275–295)
PLATELET # BLD AUTO: 206 10(3)UL (ref 150–450)
POTASSIUM SERPL-SCNC: 4.9 MMOL/L (ref 3.5–5.1)
PROT SERPL-MCNC: 8.1 G/DL (ref 6.4–8.2)
RBC # BLD AUTO: 4.4 X10(6)UL
SODIUM SERPL-SCNC: 139 MMOL/L (ref 136–145)
WBC # BLD AUTO: 6.5 X10(3) UL (ref 4–11)

## 2022-02-28 PROCEDURE — 99214 OFFICE O/P EST MOD 30 MIN: CPT | Performed by: INTERNAL MEDICINE

## 2022-08-29 ENCOUNTER — APPOINTMENT (OUTPATIENT)
Dept: HEMATOLOGY/ONCOLOGY | Facility: HOSPITAL | Age: 67
End: 2022-08-29
Attending: INTERNAL MEDICINE
Payer: MEDICARE

## 2022-08-29 ENCOUNTER — TELEPHONE (OUTPATIENT)
Dept: HEMATOLOGY/ONCOLOGY | Facility: HOSPITAL | Age: 67
End: 2022-08-29

## 2022-08-31 ENCOUNTER — TELEPHONE (OUTPATIENT)
Dept: HEMATOLOGY/ONCOLOGY | Facility: HOSPITAL | Age: 67
End: 2022-08-31

## 2022-09-19 ENCOUNTER — TELEPHONE (OUTPATIENT)
Dept: HEMATOLOGY/ONCOLOGY | Facility: HOSPITAL | Age: 67
End: 2022-09-19

## 2023-05-26 ENCOUNTER — TELEPHONE (OUTPATIENT)
Dept: HEMATOLOGY/ONCOLOGY | Facility: HOSPITAL | Age: 68
End: 2023-05-26

## 2023-05-26 NOTE — TELEPHONE ENCOUNTER
Spoke with pt. Over year due for f.up. needs apt before will refill medication. She v/u. Transferred to [facility] to [facility].

## 2023-06-12 ENCOUNTER — APPOINTMENT (OUTPATIENT)
Dept: HEMATOLOGY/ONCOLOGY | Facility: HOSPITAL | Age: 68
End: 2023-06-12
Attending: INTERNAL MEDICINE
Payer: MEDICARE

## 2023-06-12 ENCOUNTER — TELEPHONE (OUTPATIENT)
Dept: HEMATOLOGY/ONCOLOGY | Facility: HOSPITAL | Age: 68
End: 2023-06-12

## 2023-06-12 NOTE — TELEPHONE ENCOUNTER
This patient just called a second ago to cancel her appointment with Dr. Ji Grady because her dog is bleeding and she have to take him to the [facility]. She had a 900 AM appointment today. She will call back to reschedule.

## 2024-02-06 ENCOUNTER — TELEPHONE (OUTPATIENT)
Dept: HEMATOLOGY/ONCOLOGY | Facility: HOSPITAL | Age: 69
End: 2024-02-06

## 2024-02-06 NOTE — TELEPHONE ENCOUNTER
Dr. Cavazos  [facility] [phone]  Giuliana is going to do the Spinal Cord  Stimulation Trail that last 7 days. She is taking Eliquis that she needs to stop 3days prior to the trail starting and also to be off for 7 days during the trail for a total of 7 days. She would need this to be ok with Dr. Butts. Could we also get a letter faxed over This would be ok. Fax [phone]. Thanks Amelia

## 2024-02-06 NOTE — TELEPHONE ENCOUNTER
Spoke with christina and informed pt has not been seen in office for two years and has not gotten her eliquis filled from our office. She would need an appointment for clearance. Christina v/becki and will call pt.

## 2024-02-07 ENCOUNTER — TELEPHONE (OUTPATIENT)
Dept: SURGERY | Facility: CLINIC | Age: 69
End: 2024-02-07

## 2024-02-07 NOTE — TELEPHONE ENCOUNTER
[facility] office received referral requisition of this patient for Dr. Chuy Farmer from [facility]. The referral has been faxed to Vern's office ([phone]).

## 2024-02-09 DIAGNOSIS — I26.99 PULMONARY EMBOLISM ON RIGHT (HCC): ICD-10-CM

## 2024-02-09 NOTE — TELEPHONE ENCOUNTER
Patient need a refill on her Eliquis 5 mg, she is almost out of medication,   Please send to [facility] in [place] on [address].

## 2024-02-20 ENCOUNTER — OFFICE VISIT (OUTPATIENT)
Dept: SURGERY | Facility: CLINIC | Age: 69
End: 2024-02-20
Payer: MEDICARE

## 2024-02-20 ENCOUNTER — TELEPHONE (OUTPATIENT)
Dept: SURGERY | Facility: CLINIC | Age: 69
End: 2024-02-20

## 2024-02-20 VITALS
BODY MASS INDEX: 47.84 KG/M2 | HEART RATE: 61 BPM | SYSTOLIC BLOOD PRESSURE: 128 MMHG | HEIGHT: 63 IN | WEIGHT: 270 LBS | DIASTOLIC BLOOD PRESSURE: 83 MMHG

## 2024-02-20 DIAGNOSIS — M54.16 LUMBAR RADICULOPATHY: ICD-10-CM

## 2024-02-20 DIAGNOSIS — M48.061 SPINAL STENOSIS OF LUMBAR REGION WITHOUT NEUROGENIC CLAUDICATION: Primary | ICD-10-CM

## 2024-02-20 DIAGNOSIS — Z98.1 STATUS POST LUMBAR SPINAL FUSION: ICD-10-CM

## 2024-02-20 PROCEDURE — 99205 OFFICE O/P NEW HI 60 MIN: CPT | Performed by: NEUROLOGICAL SURGERY

## 2024-02-20 RX ORDER — CLOTRIMAZOLE AND BETAMETHASONE DIPROPIONATE 10; .64 MG/G; MG/G
1 CREAM TOPICAL 3 TIMES DAILY
COMMUNITY
Start: 2023-09-25

## 2024-02-20 RX ORDER — METHYLPREDNISOLONE 4 MG/1
TABLET ORAL SEE ADMIN INSTRUCTIONS
COMMUNITY
Start: 2023-11-22

## 2024-02-20 RX ORDER — OXYCODONE HYDROCHLORIDE 10 MG/1
10 TABLET ORAL 4 TIMES DAILY PRN
COMMUNITY
Start: 2023-10-27

## 2024-02-20 RX ORDER — OXYCODONE 13.5 MG/1
1 CAPSULE, EXTENDED RELEASE ORAL 2 TIMES DAILY
COMMUNITY
Start: 2023-12-20

## 2024-02-20 NOTE — PROGRESS NOTES
[facility]        [facility]      [address]  [address]  [place] [address]    PHONE  [phone]          FAX  [phone]    [email or web address]      OFFICE CONSULTATION          Giuliana Flanagan  : 10/6/1955   MRN: [id number]    PCP: Kaylene Powell DO  Referring Provider: No ref. provider found     Insurance: Payor: MEDICARE / Plan: MEDICARE PART B ONLY / Product Type: *No Product type* /            Date of Consult:  2024    Reason for Consultation:  Our patient has been referred to our office for evaluation of:  Lower back pain    History of Present Illness:  Giuliana Flanagan is a a(n) 68 year old, female.  Our patient presents with significant lower back pain that has a longstanding history.  She underwent an L4-5 interbody fusion in the past after which she did well but then continued to have significant lower back pain.  She also reports chronic lower extremity numbness.  She is attempted multiple epidural steroid injections, physical therapy as well as oral analgesics without any significant improvement.  She denies any bowel or bladder changes.        History:  Past Medical History:   Diagnosis Date    Chronic back pain     GERD (gastroesophageal reflux disease)     Hypothyroidism     Incidental pulmonary nodule, > 3mm and < 8mm 2015    Obesity     SANG on CPAP     PE (pulmonary embolism)     Pulmonary embolism on right (HCC) 2015      Past Surgical History:   Procedure Laterality Date    BACK SURGERY      L3-4 fusion          x 2    CHOLECYSTECTOMY      HYSTERECTOMY       Family History   Problem Relation Age of Onset    Bleeding Disorders Neg     Blood Disorder Neg     Cancer Neg       Social History     Socioeconomic History    Marital status:      Spouse name: Not on file    Number of children: Not on file    Years of education: Not on file    Highest education level: Not  on file   Occupational History     Employer: [employer]   Tobacco Use    Smoking status: Former     Packs/day: 0.25     Years: 45.00     Additional pack years: 0.00     Total pack years: 11.25     Types: Cigarettes     Quit date: 2015     Years since quittin.4    Smokeless tobacco: Never   Substance and Sexual Activity    Alcohol use: Yes     Alcohol/week: 0.0 standard drinks of alcohol     Comment: occasional use, no history of excessive use    Drug use: No     Types: Oxycodone    Sexual activity: Not on file   Other Topics Concern    Not on file   Social History Narrative    .  No longer working     Social Determinants of Health     Financial Resource Strain: Not on file   Food Insecurity: Not on file   Transportation Needs: Not on file   Physical Activity: Not on file   Stress: Not on file   Social Connections: Not on file   Housing Stability: Not on file        Allergies:  No Known Allergies    Medications:  Current Outpatient Medications   Medication Sig Dispense Refill    clotrimazole-betamethasone 1-0.05 % External Cream Apply 1 Application topically 3 (three) times daily.      methylPREDNISolone 4 MG Oral Tablet Therapy Pack Take by mouth See Admin Instructions. FOLLOW DIRECTIONS ON PACKAGE      XTAMPZA ER 13.5 MG Oral Capsule Extended Release 12 hour Abuse-Deterrent Take 1 capsule by mouth 2 (two) times daily.      oxyCODONE 10 MG Oral Tab Take 1 tablet (10 mg total) by mouth 4 (four) times daily as needed for Pain.      apixaban (ELIQUIS) 5 MG Oral Tab Take 1 tablet (5 mg total) by mouth 2 (two) times daily. 60 tablet 0    Ergocalciferol (VITAMIN D OR) Take by mouth.      Ascorbic Acid (VITAMIN C OR) Take by mouth.      Multiple Vitamins-Minerals (ZINC OR) Take by mouth.      Omeprazole Magnesium 20 MG Oral Tab EC Take 20 mg by mouth daily. (Patient not taking: Reported on 2022)      gabapentin 800 MG Oral Tab Take 800 mg by mouth 3 (three) times daily.      oxyCODONE-acetaminophen   MG Oral Tab Take 1 tablet by mouth every 8 (eight) hours as needed for Pain.      Levothyroxine Sodium (SYNTHROID) 150 MCG Oral Tab 175 mcg   0    Meclizine HCl (ANTIVERT) 25 MG Oral Tab Take 1 tablet by mouth 4 (four) times daily as needed for Dizziness. 20 tablet 0        Review of Systems:  A 10-point system was reviewed.  Pertinent positives and negatives are noted in HPI.      Physical Exam:  /83 (BP Location: Left arm, Patient Position: Sitting, Cuff Size: adult)   Pulse 61   Ht 63\"   Wt 270 lb (122.5 kg)   BMI 47.83 kg/m²        Neurological Exam:    Motor Strength:  Heel/toe walk normal  5/5 in BLE throughout    Sensation to light touch:  LLE anterior thigh, foreleg and dorsum of the foot diminished sensation    DTRs:  2+/4 in BLE throughout    Long tract signs:  Negative garcia  Negative babinski  Negative clonus      Abdomen:  Soft, non-distended, non-tender, with no rebound or guarding.  No peritoneal signs.     Extremities:  Non-tender, no lower extremity edema noted.      Labs:  CBC:  Lab Results   Component Value Date    WBC 6.5 02/28/2022    HGB 12.6 02/28/2022    HCT 39.3 02/28/2022    MCV 89.3 02/28/2022    .0 02/28/2022      BMG:   Lab Results   Component Value Date     02/28/2022    K 4.9 02/28/2022    CO2 29.0 02/28/2022     02/28/2022    BUN 9 02/28/2022      INR:   No results found for: \"INR\", \"PROTIME\"     Diagnostics:  I reviewed an MRI of the lumbar spine with evidence of L4-5 interbody fusion, left sided instrumentation only.  There is severe stenosis at L3-4, adjacent to the prior fusion.  Otherwise there is proper alignment of the lumbar vertebral bodies and maintenance of regional lumbar lordosis.     Diagnosis:  1. Spinal stenosis of lumbar region without neurogenic claudication    2. Status post lumbar spinal fusion        Assessment/Plan:  Giuliana Flanagan is a a(n) 68 year old, female, with adjacent level disease and severe spinal stenosis at  L3-4 adjacent to her L4-5 prior fusion.  She has symptoms consistent with neurogenic claudication and a chronic left L5 radiculopathy numbness, following her left L4-5 interbody fusion approximately 17 years ago.  I discussed with her that without a doubt, her pain would be multifactorial and that obesity and lifestyle played a role as well.  However, I could modify the fact of the lumbar stenosis at L3-4 with a decompression surgery as an outpatient.  This would enable her to benefit from less invasive treatment modalities more readily.  I will also investigate the L4-5 fusion at the same time and arthrodesis the right L4-5 facet if there should be evidence of pseudoarthrosis.  We discussed indications and details of the procedure and we will be scheduling the procedure for 18 March at the [facility] surgery center pending preoperative clearance.    All questions and concerns were addressed. We appreciate the opportunity to participate in the care of this patient. Please do not hesitate to call our office ([phone]) with any issues.     This report will be submitted to the referring provider.    This note has been dictated utilizing voice recognition software. Unfortunately, this may lead to occasional typographical errors. If there are any questions regarding this, please do not hesitate to contact our office.         Yuri Mccurdy PA-C    2/20/2024  4:11 PM

## 2024-02-23 NOTE — TELEPHONE ENCOUNTER
Called patient to provide preop instructions & sched 2/6 wk post op appts, no answer, lvm requesting a return call.

## 2024-02-27 NOTE — TELEPHONE ENCOUNTER
Called patient again to provide preop instructions & sched RN spine appt, 2 & 6 wk post op appts, no answer, lvm requesting a return call.  LVM on home phone and cell phone

## 2024-03-04 ENCOUNTER — OFFICE VISIT (OUTPATIENT)
Dept: HEMATOLOGY/ONCOLOGY | Facility: HOSPITAL | Age: 69
End: 2024-03-04
Attending: INTERNAL MEDICINE
Payer: MEDICARE

## 2024-03-04 VITALS
SYSTOLIC BLOOD PRESSURE: 122 MMHG | RESPIRATION RATE: 18 BRPM | TEMPERATURE: 98 F | OXYGEN SATURATION: 95 % | HEIGHT: 62.99 IN | BODY MASS INDEX: 48.37 KG/M2 | DIASTOLIC BLOOD PRESSURE: 78 MMHG | HEART RATE: 58 BPM | WEIGHT: 273 LBS

## 2024-03-04 DIAGNOSIS — I26.99 PULMONARY EMBOLISM ON RIGHT (HCC): ICD-10-CM

## 2024-03-04 DIAGNOSIS — I26.99 PULMONARY EMBOLISM ON LONG-TERM ANTICOAGULATION THERAPY (HCC): Primary | ICD-10-CM

## 2024-03-04 DIAGNOSIS — Z79.01 CHRONIC ANTICOAGULATION: ICD-10-CM

## 2024-03-04 DIAGNOSIS — E83.52 HYPERCALCEMIA: ICD-10-CM

## 2024-03-04 DIAGNOSIS — Z86.711 HISTORY OF PULMONARY EMBOLISM: ICD-10-CM

## 2024-03-04 DIAGNOSIS — Z79.01 PULMONARY EMBOLISM ON LONG-TERM ANTICOAGULATION THERAPY (HCC): Primary | ICD-10-CM

## 2024-03-04 PROCEDURE — 99214 OFFICE O/P EST MOD 30 MIN: CPT | Performed by: INTERNAL MEDICINE

## 2024-03-04 PROCEDURE — G2211 COMPLEX E/M VISIT ADD ON: HCPCS | Performed by: INTERNAL MEDICINE

## 2024-03-04 NOTE — PROGRESS NOTES
Hematology Clinic Follow Up Note    Patient Name: Giuliana Flanagan  Medical Record Number: [id number]   YOB: 1955    PCP: Dr. Kaylene Powell    Reason for Consultation:  Giuliana Flanagan was seen today for the diagnosis of pulmonary embolism    Hematologic History:  *Acute pulmonary embolism- unprovoked, idiopathic  --9/25/15 presented to [facility] ER with acute pleuritic chest pain-> CTA chest showed right segmental PE   -started on LMWH in hospital   -discharged on eliquis   -thrombophilia evaluation including FVL, PT mutation testing, APLA testing, protein C, protein S, and antithrombin activities were all unremarkable.   -continued chronic eliquis    ============================  Interval events: Remains on eliquis 5mg BID without any issues, no bleeding or excessive bruising.  Mild chronic BLE swelling is stable without recent change. No significant leg pains.  She denies any dyspnea or chest pain.  She reports having had a few falls due to missteps.  Chronic back pain persists.  She is still thinking about whether or not she wants to pursue back surgery.      Past Medical History:  Past Medical History:   Diagnosis Date    Chronic back pain     GERD (gastroesophageal reflux disease)     Hypothyroidism     Incidental pulmonary nodule, > 3mm and < 8mm 2015    Obesity     SANG on CPAP     PE (pulmonary embolism)     Pulmonary embolism on right (HCC) 2015      Past Surgical History:   Procedure Laterality Date    BACK SURGERY      L3-4 fusion          x 2    CHOLECYSTECTOMY      HYSTERECTOMY       Home Medications:   clotrimazole-betamethasone 1-0.05 % External Cream Apply 1 Application topically 3 (three) times daily.      methylPREDNISolone 4 MG Oral Tablet Therapy Pack Take by mouth See Admin Instructions. FOLLOW DIRECTIONS ON PACKAGE      XTAMPZA ER 13.5 MG Oral Capsule Extended Release 12 hour Abuse-Deterrent Take 1 capsule by mouth 2 (two) times daily.      oxyCODONE 10 MG  Oral Tab Take 1 tablet (10 mg total) by mouth 4 (four) times daily as needed for Pain.      apixaban (ELIQUIS) 5 MG Oral Tab Take 1 tablet (5 mg total) by mouth 2 (two) times daily. 60 tablet 0    Ergocalciferol (VITAMIN D OR) Take by mouth.      Ascorbic Acid (VITAMIN C OR) Take by mouth.      Multiple Vitamins-Minerals (ZINC OR) Take by mouth.      Omeprazole Magnesium 20 MG Oral Tab EC Take 1 tablet (20 mg total) by mouth daily.      gabapentin 800 MG Oral Tab Take 1 tablet (800 mg total) by mouth 3 (three) times daily.      oxyCODONE-acetaminophen  MG Oral Tab Take 1 tablet by mouth every 8 (eight) hours as needed for Pain.      Levothyroxine Sodium (SYNTHROID) 150 MCG Oral Tab 175 mcg   0    Meclizine HCl (ANTIVERT) 25 MG Oral Tab Take 1 tablet by mouth 4 (four) times daily as needed for Dizziness. 20 tablet 0     Allergies:   No Known Allergies    Psychosocial History:  Social History     Social History Narrative    .  No longer working     Social History     Socioeconomic History    Marital status:    Occupational History     Employer: [employer]   Tobacco Use    Smoking status: Former     Packs/day: 0.25     Years: 45.00     Additional pack years: 0.00     Total pack years: 11.25     Types: Cigarettes     Quit date: 2015     Years since quittin.4    Smokeless tobacco: Never   Substance and Sexual Activity    Alcohol use: Yes     Alcohol/week: 0.0 standard drinks of alcohol     Comment: occasional use, no history of excessive use    Drug use: No     Types: Oxycodone   Social History Narrative    .  No longer working     Family Medical History:  Family History   Problem Relation Age of Onset    Bleeding Disorders Neg     Blood Disorder Neg     Cancer Neg      Review of Systems:  A 10-point ROS was done with pertinent positives and negative per the HPI    Vital Signs:  Height: 160 cm (5' 2.99\") ( 1025)  Weight: 123.8 kg (273 lb) ( 1025)  BSA (Calculated - sq m):  2.21 sq meters (03/04 1025)  Pulse: 58 (03/04 1025)  BP: 122/78 (03/04 1025)  Temp: 97.6 °F (36.4 °C) (03/04 1025)  Do Not Use - Resp Rate: --  SpO2: 95 % (03/04 1025)    Wt Readings from Last 6 Encounters:   03/04/24 123.8 kg (273 lb)   02/20/24 122.5 kg (270 lb)   02/28/22 128.6 kg (283 lb 8 oz)   08/26/21 128.8 kg (284 lb)   02/25/21 129.3 kg (285 lb)   03/05/20 123.4 kg (272 lb)     Physical Examination:  General: Patient is alert and oriented, not in acute distress  Psych: Mood and affect are appropriate  Eyes: EOMI  ENT: Oropharynx is clear.   CV: Regular rate and rhythm, no murmurs  Respiratory: Lungs clear to auscultation bilaterally  GI/Abd: Soft, non-tender with normoactive bowel sounds, no hepatosplenomegaly  Neurological: Grossly intact   Lymphatics: No palpable lymphadenopathy  Skin: no rashes or petechiae  Ext: trace symmetric BLE edema    Laboratory:  Lab Results   Component Value Date    WBC 7.5 03/04/2024    WBC 6.5 02/28/2022    WBC 6.8 08/26/2021    HGB 13.3 03/04/2024    HGB 12.6 02/28/2022    HGB 12.7 08/26/2021    HCT 41.0 03/04/2024    MCV 88.2 03/04/2024    MCH 28.6 03/04/2024    MCHC 32.4 03/04/2024    RDW 15.6 03/04/2024    .0 03/04/2024    .0 02/28/2022    .0 08/26/2021     Lab Results   Component Value Date     (H) 03/04/2024    BUN 9 03/04/2024    BUNCREA 15.4 02/25/2021    CREATSERUM 0.85 03/07/2024    CREATSERUM 0.96 03/04/2024    CREATSERUM 0.90 02/28/2022    ANIONGAP 1 03/04/2024    GFR 93 01/04/2018    GFRNAA 68 02/28/2022    GFRAA 78 02/28/2022    CA 10.3 (H) 03/07/2024    OSMOCALC 287 03/04/2024    ALKPHO 75 03/04/2024    AST 5 (L) 03/04/2024    ALT 21 03/04/2024    BILT 0.4 03/04/2024    TP 7.6 03/04/2024    ALB 3.7 03/04/2024    GLOBULIN 3.9 03/04/2024     03/04/2024    K 5.0 03/04/2024     03/04/2024    CO2 30.0 03/04/2024     No results found for: \"PTT\", \"PT\", \"INR\"    Impression & Plan:     *pulmonary embolism  -unprovoked, idiopathic.   Thrombophilia evaluation was negative.   -we have previously discussed that given the unprovoked nature of her thrombosis, she has a relatively high risk for recurrence.   -Given her weight remains >120 kg  I favor dosing Eliquis at 5mg BID.  If she can lose weight and get <120kg we would switch to eliquis 2.5mg BID which may have a lower bleeding risk.    -repeat CBC and CMP q6 months while on anticoagulation    *Smoking cessation  -stopped 9/23/15- encouraged continued abstinence     *Hypercalcemia due to hyperparathyroidism  -Repeat labs show persistently elevated calcium level with elevated PTH.  Will refer to endocrine for further evaluation of suspected hyperparathyroidism.      RTC in 6 months    Bud Butts MD  Hematology/Medical Oncology  [facility]

## 2024-03-04 NOTE — PROGRESS NOTES
Education Record     Learner:  Patient     Disease / Diagnosis:PE     Barriers / Limitations:  None                Comments:     Method:  Discussion                Comments:     General Topics:  Plan of care reviewed                Comments:     Outcome:  Shows understanding                Comments: last seen 2 years ago. Pt states she has been taking eliquis without complications. Pt states she is thinking about having back surgery but has not decided yet.

## 2024-03-05 ENCOUNTER — TELEPHONE (OUTPATIENT)
Dept: HEMATOLOGY/ONCOLOGY | Facility: HOSPITAL | Age: 69
End: 2024-03-05

## 2024-03-05 NOTE — TELEPHONE ENCOUNTER
Bud Butts MD  [facility]  Please call patient advise that her calcium level is elevated for unknown reasons.  Please inquire to see if she is taking any calcium supplements, if she is, advise her to stop them.  Have her return within the next 2-7 days for repeat labs to further evaluate.  She should make sure that she is well-hydrated prior to getting these labs drawn.    BRANDONVTCB, will send [employer] message.

## 2024-03-06 NOTE — TELEPHONE ENCOUNTER
S/w: pt who stated she does not want to have surgery at this time as she has other health issues that are more important. Patient stated she will f/u after she takes care of other health concerns.

## 2024-03-06 NOTE — TELEPHONE ENCOUNTER
Called pt at home ph, cell ph and husbands cell ph, no answer, LMTCB to confirm by EOD if she is still interested in having surgery & to sched RN spine nuria appt, 2 & 6 wk post op visits.

## 2024-03-07 ENCOUNTER — NURSE ONLY (OUTPATIENT)
Dept: HEMATOLOGY/ONCOLOGY | Facility: HOSPITAL | Age: 69
End: 2024-03-07
Attending: INTERNAL MEDICINE
Payer: MEDICARE

## 2024-03-07 DIAGNOSIS — E83.52 HYPERCALCEMIA: ICD-10-CM

## 2024-03-07 LAB
CALCIUM BLD-MCNC: 10.3 MG/DL (ref 8.5–10.1)
CREAT BLD-MCNC: 0.85 MG/DL
PHOSPHATE SERPL-MCNC: 3.3 MG/DL (ref 2.5–4.9)
PTH-INTACT SERPL-MCNC: 122 PG/ML (ref 18.5–88)

## 2024-03-07 PROCEDURE — 82565 ASSAY OF CREATININE: CPT

## 2024-03-07 PROCEDURE — 82310 ASSAY OF CALCIUM: CPT

## 2024-03-07 PROCEDURE — 36415 COLL VENOUS BLD VENIPUNCTURE: CPT

## 2024-03-07 PROCEDURE — 83970 ASSAY OF PARATHORMONE: CPT

## 2024-03-07 PROCEDURE — 84100 ASSAY OF PHOSPHORUS: CPT

## 2024-03-08 PROBLEM — Z86.711 HISTORY OF PULMONARY EMBOLISM: Status: ACTIVE | Noted: 2024-03-08

## 2024-03-08 PROBLEM — E83.52 HYPERCALCEMIA: Status: ACTIVE | Noted: 2024-03-08

## 2024-04-02 ENCOUNTER — OFFICE VISIT (OUTPATIENT)
Facility: CLINIC | Age: 69
End: 2024-04-02
Payer: MEDICARE

## 2024-04-02 VITALS — OXYGEN SATURATION: 97 % | SYSTOLIC BLOOD PRESSURE: 126 MMHG | HEART RATE: 65 BPM | DIASTOLIC BLOOD PRESSURE: 80 MMHG

## 2024-04-02 DIAGNOSIS — E06.3 HYPOTHYROIDISM DUE TO HASHIMOTO'S THYROIDITIS: ICD-10-CM

## 2024-04-02 DIAGNOSIS — E21.0 PRIMARY HYPERPARATHYROIDISM (HCC): ICD-10-CM

## 2024-04-02 DIAGNOSIS — E83.52 HYPERCALCEMIA: Primary | ICD-10-CM

## 2024-04-02 DIAGNOSIS — E03.8 HYPOTHYROIDISM DUE TO HASHIMOTO'S THYROIDITIS: ICD-10-CM

## 2024-04-02 PROCEDURE — 99205 OFFICE O/P NEW HI 60 MIN: CPT | Performed by: STUDENT IN AN ORGANIZED HEALTH CARE EDUCATION/TRAINING PROGRAM

## 2024-04-02 NOTE — PROGRESS NOTES
ENDOCRINOLOGY, DIABETES, & METABOLISM NOTE     Subjective:   Giuliana Flanagan is a 68 year old female who presents for hypercalcemia.    Initial HPI consult in 2024    First noted hypercalcemia on lab work since     -2022 Ca 10.5 with albumin 4.2  Fhx of hyperCa or MEN: denies  Hx of stones: denies  Hx of fragility fractures or osteoporosis: denies; no previous bone density  Medications (thiazides that actively resorb Ca, lithium, steroids, Forteo, tums, Ca supp): denies. Was on medrol dose pack but states this was for a short period of time  Diet (excessive milk/calcium): denies   Surgeries/immobilization: denies  Any hx of malignancies: denies; diagnosed with a pulmonary embolism around .  Thrombophilia evaluation including FVL, PT mutation testing, APLA testing, protein C, protein S, and antithrombin activities were all unremarkable.    Hx of hypothyroidism, diagnosed many years ago. Currently on LT4 150 mcg daily. Did stop taking it for 3-4 weeks around 2023 and restarted around end of 2024.       History/Other:    Chief Complaint Reviewed and Verified  Nursing Notes Reviewed and   Verified  Tobacco Reviewed  Allergies Reviewed         Tobacco:  She smoked tobacco in the past but quit greater than 12 months ago.  Social History    Tobacco Use      Smoking status: Former        Packs/day: 0.25        Years: 45.00        Additional pack years: 0.00        Total pack years: 11.25        Types: Cigarettes        Quit date: 2015        Years since quittin.5      Smokeless tobacco: Never       Current Outpatient Medications   Medication Sig Dispense Refill    apixaban (ELIQUIS) 5 MG Oral Tab Take 1 tablet (5 mg total) by mouth 2 (two) times daily. 180 tablet 1    clotrimazole-betamethasone 1-0.05 % External Cream Apply 1 Application topically 3 (three) times daily.      methylPREDNISolone 4 MG Oral Tablet Therapy Pack Take by mouth See Admin Instructions. FOLLOW DIRECTIONS ON  PACKAGE      XTAMPZA ER 13.5 MG Oral Capsule Extended Release 12 hour Abuse-Deterrent Take 1 capsule by mouth 2 (two) times daily.      oxyCODONE 10 MG Oral Tab Take 1 tablet (10 mg total) by mouth 4 (four) times daily as needed for Pain.      Ergocalciferol (VITAMIN D OR) Take by mouth.      Ascorbic Acid (VITAMIN C OR) Take by mouth.      Multiple Vitamins-Minerals (ZINC OR) Take by mouth.      Omeprazole Magnesium 20 MG Oral Tab EC Take 1 tablet (20 mg total) by mouth daily.      gabapentin 800 MG Oral Tab Take 1 tablet (800 mg total) by mouth 3 (three) times daily.      oxyCODONE-acetaminophen  MG Oral Tab Take 1 tablet by mouth every 8 (eight) hours as needed for Pain.      Levothyroxine Sodium (SYNTHROID) 150 MCG Oral Tab 175 mcg   0    Meclizine HCl (ANTIVERT) 25 MG Oral Tab Take 1 tablet by mouth 4 (four) times daily as needed for Dizziness. 20 tablet 0     No Known Allergies  Past Medical History:   Diagnosis Date    Chronic back pain     GERD (gastroesophageal reflux disease)     Hypothyroidism     Incidental pulmonary nodule, > 3mm and < 8mm 2015    Obesity     SANG on CPAP     PE (pulmonary embolism)     Pulmonary embolism on right (HCC) 2015      Past Surgical History:   Procedure Laterality Date    BACK SURGERY      L3-4 fusion          x 2    CHOLECYSTECTOMY      HYSTERECTOMY       Social History     Socioeconomic History    Marital status:    Occupational History     Employer: [employer]   Tobacco Use    Smoking status: Former     Packs/day: 0.25     Years: 45.00     Additional pack years: 0.00     Total pack years: 11.25     Types: Cigarettes     Quit date: 2015     Years since quittin.5    Smokeless tobacco: Never   Substance and Sexual Activity    Alcohol use: Yes     Alcohol/week: 0.0 standard drinks of alcohol     Comment: occasional use, no history of excessive use    Drug use: No     Types: Oxycodone   Social History Narrative    .  No longer  working     Family History   Problem Relation Age of Onset    Bleeding Disorders Neg     Blood Disorder Neg     Cancer Neg          Review of Systems:  10 point ROS completed, refer to HPI for pertinent positives    Objective:   /80   Pulse 65   SpO2 97%  Estimated body mass index is 48.37 kg/m² as calculated from the following:    Height as of 3/4/24: 5' 2.99\" (1.6 m).    Weight as of 3/4/24: 273 lb (123.8 kg).  General Appearance:  Alert, in no acute distress, well developed  Eyes:  normal conjunctivae, sclera  Ears/Nose/Mouth/Throat/Neck:  normal hearing, normal speech and no palpable thyroid nodules  Respiratory:  breathing comfortably on room air, clear to auscultation bilaterally  Cardiovascular:  regular rate and rhythm, no murmurs, S3 or S4   Psychiatric:  Oriented to person, place and time, appropriate mood & affect  Skin: Normal moisture and skin texture  Neuro: sensory grossly intact, motor grossly intact. normal gait.      Laboratory Data   Recent Labs: Labs reviewed in [facility] under lab tab on 4/2/2024. Interpretation: hypercalcemia with hyperparathyroidism 3/2024             Component  Ref Range & Units 3 wk ago 4 wk ago 2 yr ago   Creatinine  0.55 - 1.02 mg/dL 0.85 0.96 0.90   Calcium, Total  8.5 - 10.1 mg/dL 10.3 10.7 9.9   Phosphorus  2.5 - 4.9 mg/dL 3.3     Pth Intact  18.5 - 88.0 pg/mL 122.0           Imaging   Radiology: Personally reviewed on 4/2/2024  I reviewed the patient's records from outside facility: No bone density on file       DXA:  Date L2-L4 BMD T-score % change Mean Femoral Neck BMD T-score % change                      ASSESSMENT/PLAN   Assessment & Plan:     Giuliana Flanagan is a 68 year old female who presented to clinic for:    1. Hypercalcemia (Primary)  -     Vitamin D, 25-Hydroxy; Future; Expected date: 04/03/2024  -     PTH, Intact; Future; Expected date: 04/03/2024  -     Renal Function Panel; Future; Expected date: 04/03/2024  -     Calcium Ionized-Out Patient;  Future; Expected date: 04/03/2024  -     Phosphorus; Future; Expected date: 04/03/2024  -     Magnesium; Future; Expected date: 04/03/2024  -     XR DEXA BONE DENSITOMETRY (CPT=77080); Future; Expected date: 04/03/2024  -     C-Telopeptide ([employer]); Future; Expected date: 04/11/2024  2. Hypothyroidism due to Hashimoto's thyroiditis  -     TSH and Free T4; Future; Expected date: 05/02/2024  3. Primary hyperparathyroidism (HCC)  -     XR DEXA BONE DENSITOMETRY (CPT=77080); Future; Expected date: 04/03/2024  -     C-Telopeptide ([employer]); Future; Expected date: 04/11/2024   -Discussed the pathophysiology and natural course of PHPT, reviewed medical and surgical options (including no intervention, just monitoring).   -  based on initial labs and clinical presentation, PTH inappropriately normal or mildly elevated: primary hyperPTH (10-20% of pts) vs FHH  - pt is not on Ca-inducing medications, no excessive ca intake   - Cr wnl, not causing secondary hyperPTH  - will check a vit D, although even if low, would not fully be contributing to elevated PTH  - no Fhx of hyperCa/hyperPTH  - will check DXA and CTX for baseline; no hx of fragility fx or kidney stones   - Discussed medical vs surgical mgmt    - Will obtain Ca, iCa, PTH, 25-OHD, TSH, CMP, CTX  - once results are back, will discuss next step of workup       Return in about 4 weeks (around 4/30/2024).    A total of 60 minutes was spent today on obtaining history, reviewing pertinent labs, evaluating patient, providing multiple treatment options, reinforcing diet/exercise and compliance, and completing documentation.      Sangita Ruvalcaba DO, 4/2/2024

## 2024-04-02 NOTE — PATIENT INSTRUCTIONS
Return Visit   [x  ] Physician in 4-6 weeks   [  ] In person or video visit  [  ] In person only    [ x ] After visit summary   [ x ] Placed labs/imaging. Labs are to be drawn at 8A and fasting.   [ x ] Central scheduling # for ultrasound/nuclear med/CT/MRI/DXA     It was great seeing you today!    Today we discussed your calcium:  -We reviewed that you have had borderline high calcium since last year and your most recent levels show an elevated calcium with an elevated PTH level  -Once you complete your labs, we will discuss next steps/if you need to complete a 24 hour urine collection prior to our visit  -I will also have you complete a bone density prior to our follow up   -In around 4-6 weeks, we will meet and review your labs and imaging. This will help guide our next steps (solidify diagnosis and discuss medical vs. Surgical options)    Take care!  -Dr. Ruvalcaba

## 2024-04-11 ENCOUNTER — HOSPITAL ENCOUNTER (OUTPATIENT)
Dept: BONE DENSITY | Age: 69
Discharge: HOME | End: 2024-04-11
Attending: STUDENT IN AN ORGANIZED HEALTH CARE EDUCATION/TRAINING PROGRAM
Payer: MEDICARE

## 2024-04-11 DIAGNOSIS — E21.0 PRIMARY HYPERPARATHYROIDISM (HCC): ICD-10-CM

## 2024-04-11 DIAGNOSIS — E83.52 HYPERCALCEMIA: ICD-10-CM

## 2024-04-11 PROCEDURE — 77080 DXA BONE DENSITY AXIAL: CPT | Performed by: STUDENT IN AN ORGANIZED HEALTH CARE EDUCATION/TRAINING PROGRAM

## 2024-04-15 ENCOUNTER — LAB ENCOUNTER (OUTPATIENT)
Dept: LAB | Age: 69
End: 2024-04-15
Attending: STUDENT IN AN ORGANIZED HEALTH CARE EDUCATION/TRAINING PROGRAM
Payer: MEDICARE

## 2024-04-15 DIAGNOSIS — E21.0 PRIMARY HYPERPARATHYROIDISM (HCC): ICD-10-CM

## 2024-04-15 DIAGNOSIS — E83.52 HYPERCALCEMIA: ICD-10-CM

## 2024-04-15 LAB
ALBUMIN SERPL-MCNC: 4.3 G/DL (ref 3.2–4.8)
ANION GAP SERPL CALC-SCNC: 5 MMOL/L (ref 0–18)
BUN BLD-MCNC: 11 MG/DL (ref 9–23)
BUN/CREAT SERPL: 14.1 (ref 10–20)
CALCIUM BLD-MCNC: 9.9 MG/DL (ref 8.7–10.4)
CHLORIDE SERPL-SCNC: 110 MMOL/L (ref 98–112)
CO2 SERPL-SCNC: 29 MMOL/L (ref 21–32)
CREAT BLD-MCNC: 0.78 MG/DL
EGFRCR SERPLBLD CKD-EPI 2021: 84 ML/MIN/1.73M2 (ref 60–?)
GLUCOSE BLD-MCNC: 117 MG/DL (ref 70–99)
MAGNESIUM SERPL-MCNC: 2.2 MG/DL (ref 1.6–2.6)
OSMOLALITY SERPL CALC.SUM OF ELEC: 298 MOSM/KG (ref 275–295)
PHOSPHATE SERPL-MCNC: 3.5 MG/DL (ref 2.4–5.1)
POTASSIUM SERPL-SCNC: 4.4 MMOL/L (ref 3.5–5.1)
PTH-INTACT SERPL-MCNC: 117.4 PG/ML (ref 18.5–88)
SODIUM SERPL-SCNC: 144 MMOL/L (ref 136–145)
VIT D+METAB SERPL-MCNC: 24.5 NG/ML (ref 30–100)

## 2024-04-15 PROCEDURE — 83970 ASSAY OF PARATHORMONE: CPT

## 2024-04-15 PROCEDURE — 83735 ASSAY OF MAGNESIUM: CPT

## 2024-04-15 PROCEDURE — 82523 COLLAGEN CROSSLINKS: CPT

## 2024-04-15 PROCEDURE — 82330 ASSAY OF CALCIUM: CPT

## 2024-04-15 PROCEDURE — 82306 VITAMIN D 25 HYDROXY: CPT

## 2024-04-15 PROCEDURE — 80069 RENAL FUNCTION PANEL: CPT

## 2024-04-15 PROCEDURE — 36415 COLL VENOUS BLD VENIPUNCTURE: CPT

## 2024-04-17 LAB — LC CALCIUM, IONIZED: 5.1 MG/DL

## 2024-04-19 LAB — C-TELOPEPTIDE: 366 PG/ML

## 2024-04-23 ENCOUNTER — TELEPHONE (OUTPATIENT)
Facility: CLINIC | Age: 69
End: 2024-04-23

## 2024-04-23 DIAGNOSIS — E21.0 PRIMARY HYPERPARATHYROIDISM (HCC): Primary | ICD-10-CM

## 2024-04-23 DIAGNOSIS — E83.52 HYPERCALCEMIA: ICD-10-CM

## 2024-04-23 NOTE — TELEPHONE ENCOUNTER
Spoke with patient on telephone and reviewed result notes from labs dated 4/15/24. She verbalized understanding of all. She is agreeable to doing 24 hour urine collection- reviewed directions. Reviewed if possible, she could try to get done prior to scheduled 5/13/24 appointment with Dr. Ruvalcaba, so they can go through results together, states will try.    [employer] message reviewing directions for urine collection as well.     Routed to Dr. Ruvalcaba with lab orders pended

## 2024-05-14 ENCOUNTER — LAB ENCOUNTER (OUTPATIENT)
Dept: LAB | Facility: HOSPITAL | Age: 69
End: 2024-05-14
Attending: STUDENT IN AN ORGANIZED HEALTH CARE EDUCATION/TRAINING PROGRAM

## 2024-05-14 ENCOUNTER — OFFICE VISIT (OUTPATIENT)
Facility: CLINIC | Age: 69
End: 2024-05-14

## 2024-05-14 VITALS — OXYGEN SATURATION: 97 % | HEART RATE: 78 BPM | DIASTOLIC BLOOD PRESSURE: 86 MMHG | SYSTOLIC BLOOD PRESSURE: 134 MMHG

## 2024-05-14 DIAGNOSIS — E21.0 PRIMARY HYPERPARATHYROIDISM (HCC): Primary | ICD-10-CM

## 2024-05-14 DIAGNOSIS — E03.8 HYPOTHYROIDISM DUE TO HASHIMOTO'S THYROIDITIS: ICD-10-CM

## 2024-05-14 DIAGNOSIS — E06.3 HYPOTHYROIDISM DUE TO HASHIMOTO'S THYROIDITIS: ICD-10-CM

## 2024-05-14 LAB
T4 FREE SERPL-MCNC: 1.1 NG/DL (ref 0.8–1.7)
TSI SER-ACNC: 1.39 MIU/ML (ref 0.36–3.74)

## 2024-05-14 PROCEDURE — 99214 OFFICE O/P EST MOD 30 MIN: CPT | Performed by: STUDENT IN AN ORGANIZED HEALTH CARE EDUCATION/TRAINING PROGRAM

## 2024-05-14 PROCEDURE — 84443 ASSAY THYROID STIM HORMONE: CPT

## 2024-05-14 PROCEDURE — 84439 ASSAY OF FREE THYROXINE: CPT

## 2024-05-14 PROCEDURE — 36415 COLL VENOUS BLD VENIPUNCTURE: CPT

## 2024-05-14 RX ORDER — LEVOTHYROXINE SODIUM 0.15 MG/1
150 TABLET ORAL
Qty: 90 TABLET | Refills: 1 | Status: SHIPPED | OUTPATIENT
Start: 2024-05-14 | End: 2024-11-10

## 2024-05-14 NOTE — PROGRESS NOTES
ENDOCRINOLOGY, DIABETES, & METABOLISM NOTE     Subjective:   Giuliana Flanagan is a 68 year old female who presents for hypercalcemia.    Initial HPI consult in 2024    First noted hypercalcemia on lab work since     -2022 Ca 10.5 with albumin 4.2  Fhx of hyperCa or MEN: denies  Hx of stones: denies  Hx of fragility fractures or osteoporosis: denies; no previous bone density  Medications (thiazides that actively resorb Ca, lithium, steroids, Forteo, tums, Ca supp): denies. Was on medrol dose pack but states this was for a short period of time  Diet (excessive milk/calcium): denies   Surgeries/immobilization: denies  Any hx of malignancies: denies; diagnosed with a pulmonary embolism around .  Thrombophilia evaluation including FVL, PT mutation testing, APLA testing, protein C, protein S, and antithrombin activities were all unremarkable.     Hx of hypothyroidism, diagnosed many years ago. Currently on LT4 150 mcg daily. Did stop taking it for 3-4 weeks around 2023 and restarted around end of 2024.     Interval history 2024  Currently on vitamin D 2000 units since 4/15/2024   Denies any falls or fractures since last visit  Urine studies ordered however patient was unable to complete them before this visit  Continues on levothyroxine 150 mcg daily, takes it appropriately     History/Other:    Chief Complaint Reviewed and Verified  Nursing Notes Reviewed and   Verified  Tobacco Reviewed  Medications Reviewed  Medical History   Reviewed  Surgical History Reviewed  Family History Reviewed  Social   History Reviewed         Tobacco:  She smoked tobacco in the past but quit greater than 12 months ago.  Social History     Tobacco Use   Smoking Status Former    Current packs/day: 0.00    Average packs/day: 0.3 packs/day for 45.0 years (11.3 ttl pk-yrs)    Types: Cigarettes    Start date: 1970    Quit date: 2015    Years since quittin.6   Smokeless Tobacco Never        Current  Outpatient Medications   Medication Sig Dispense Refill    apixaban (ELIQUIS) 5 MG Oral Tab Take 1 tablet (5 mg total) by mouth 2 (two) times daily. 180 tablet 1    clotrimazole-betamethasone 1-0.05 % External Cream Apply 1 Application topically 3 (three) times daily.      methylPREDNISolone 4 MG Oral Tablet Therapy Pack Take by mouth See Admin Instructions. FOLLOW DIRECTIONS ON PACKAGE      XTAMPZA ER 13.5 MG Oral Capsule Extended Release 12 hour Abuse-Deterrent Take 1 capsule by mouth 2 (two) times daily.      oxyCODONE 10 MG Oral Tab Take 1 tablet (10 mg total) by mouth 4 (four) times daily as needed for Pain.      Ergocalciferol (VITAMIN D OR) Take by mouth.      Ascorbic Acid (VITAMIN C OR) Take by mouth.      Multiple Vitamins-Minerals (ZINC OR) Take by mouth.      Omeprazole Magnesium 20 MG Oral Tab EC Take 1 tablet (20 mg total) by mouth daily.      gabapentin 800 MG Oral Tab Take 1 tablet (800 mg total) by mouth 3 (three) times daily.      oxyCODONE-acetaminophen  MG Oral Tab Take 1 tablet by mouth every 8 (eight) hours as needed for Pain.      Levothyroxine Sodium (SYNTHROID) 150 MCG Oral Tab 175 mcg   0    Meclizine HCl (ANTIVERT) 25 MG Oral Tab Take 1 tablet by mouth 4 (four) times daily as needed for Dizziness. 20 tablet 0     No Known Allergies  Past Medical History:    Chronic back pain    GERD (gastroesophageal reflux disease)    Hypothyroidism    Incidental pulmonary nodule, > 3mm and < 8mm    Obesity    SANG on CPAP    PE (pulmonary embolism)    Pulmonary embolism on right (HCC)      Past Surgical History:   Procedure Laterality Date    Back surgery      L3-4 fusion          x 2    Cholecystectomy      Hysterectomy       Social History     Socioeconomic History    Marital status:    Occupational History     Employer: [employer]   Tobacco Use    Smoking status: Former     Current packs/day: 0.00     Average packs/day: 0.3 packs/day for 45.0 years (11.3 ttl pk-yrs)     Types:  Cigarettes     Start date: 1970     Quit date: 2015     Years since quittin.6    Smokeless tobacco: Never   Substance and Sexual Activity    Alcohol use: Yes     Alcohol/week: 0.0 standard drinks of alcohol     Comment: occasional use, no history of excessive use    Drug use: No     Types: Oxycodone   Social History Narrative    .  No longer working     Social Determinants of Health      [facility]    Social Connections    [facility]    Housing Stability     Family History   Problem Relation Age of Onset    Bleeding Disorders Neg     Blood Disorder Neg     Cancer Neg          Review of Systems:  10 point ROS completed, refer to HPI for pertinent positives    Objective:   /86   Pulse 78   SpO2 97%  Estimated body mass index is 48.37 kg/m² as calculated from the following:    Height as of 3/4/24: 5' 2.99\" (1.6 m).    Weight as of 3/4/24: 273 lb (123.8 kg).  General Appearance:  Alert, in no acute distress, well developed  Eyes:  normal conjunctivae, sclera  Ears/Nose/Mouth/Throat/Neck:  normal hearing, normal speech and no palpable thyroid nodules  Respiratory:  breathing comfortably on room air, clear to auscultation bilaterally  Cardiovascular:  regular rate and rhythm, no murmurs, S3 or S4   Psychiatric:  Oriented to person, place and time, appropriate mood & affect  Skin: Normal moisture and skin texture  Neuro: sensory grossly intact, motor grossly intact. normal gait.      Laboratory Data   Recent Labs: Labs reviewed in [facility] under lab tab on 2024. Interpretation: hypercalcemia with hyperparathyroidism 3/2024   2024 labs with elevated PTH and calcium within range, vitamin D less than 30, CTX stable    Component      Latest Ref Rng 4/15/2024   Glucose      70 - 99 mg/dL 117 (H)    Sodium      136 - 145 mmol/L 144    Potassium      3.5 - 5.1 mmol/L  4.4    Chloride      98 - 112 mmol/L 110    Carbon Dioxide, Total      21.0 - 32.0 mmol/L 29.0    ANION GAP      0 - 18 mmol/L 5    BUN      9 - 23 mg/dL 11    CREATININE      0.55 - 1.02 mg/dL 0.78    BUN/CREATININE RATIO      10.0 - 20.0  14.1    CALCIUM      8.7 - 10.4 mg/dL 9.9    CALCULATED OSMOLALITY      275 - 295 mOsm/kg 298 (H)    EGFR      >=60 mL/min/1.73m2 84    Albumin      3.2 - 4.8 g/dL 4.3    PHOSPHORUS      2.4 - 5.1 mg/dL 3.5    VITAMIN D, 25-OH, TOTAL      30.0 - 100.0 ng/mL 24.5 (L)    PTH INTACT      18.5 - 88.0 pg/mL 117.4 (H)    CALCIUM, IONIZED      4.5 - 5.6 mg/dL 5.1    Magnesium, Serum      1.6 - 2.6 mg/dL 2.2    C-TELOPEPTIDE (S)      pg/mL 366              Component  Ref Range & Units 3 wk ago 4 wk ago 2 yr ago   Creatinine  0.55 - 1.02 mg/dL 0.85 0.96 0.90   Calcium, Total  8.5 - 10.1 mg/dL 10.3 10.7 9.9   Phosphorus  2.5 - 4.9 mg/dL 3.3     Pth Intact  18.5 - 88.0 pg/mL 122.0     3/20/2024 TSH 9.34 with free T40.69      Imaging   Radiology: Personally reviewed on 5/14/2024  I reviewed the patient's records from outside facility: No bone density on file       DXA:  Date L2-L4 BMD T-score % change Left femoral Neck BMD T-score % change   4/11/2024 BK 1.084 0.6 0.818 -0.3               ASSESSMENT/PLAN   Assessment & Plan:     Giuliana Flanagan is a 68 year old female who presented to clinic for:    1. Primary hyperparathyroidism (HCC) (Primary)  -     PTH, Intact; Future; Expected date: 10/14/2024  -     Vitamin D, 25-Hydroxy; Future; Expected date: 10/14/2024  -     Renal Function Panel; Future; Expected date: 10/14/2024   -Discussed the pathophysiology and natural course of PHPT, reviewed medical and surgical options (including no intervention, just monitoring).   -  based on initial labs and clinical presentation, PTH inappropriately normal or mildly elevated: primary hyperPTH (10-20% of pts) vs FHH; this is likely early primary hyper para versus normocalcemic hyperparathyroidism  -  pt is not on Ca-inducing medications, no excessive ca intake   - Cr wnl, not causing secondary hyperPTH  - no Fhx of hyperCa/hyperPTH; no hx of fragility fx or kidney stones   - .4, vitamin D 24.5, calcium 9.9, albumin 4.3, , Phos 3.5, mag 2.2  -4/11/2024 bone density without osteopenia or osteoporosis  - Discussed medical vs surgical mgmt    Plan  - Will obtain 24-hour urine collection to help clarify diagnosis  -Continue vitamin D 2000 units daily  - once results are back, will discuss next step of workup    2. Hypothyroidism due to Hashimoto's thyroiditis  -     TSH and Free T4; Future; Expected date: 10/14/2024  Patient endorses compliance with levothyroxine 150 mcg daily and states she takes it appropriately  Patient to repeat labs today and will titrate dose of levothyroxine accordingly once labs result       Return in about 6 months (around 11/4/2024).    A total of 30 minutes was spent today on obtaining history, reviewing pertinent labs, evaluating patient, providing multiple treatment options, reinforcing diet/exercise and compliance, and completing documentation.      Sangita Ruvalcaba DO, 5/14/2024

## 2024-05-14 NOTE — PATIENT INSTRUCTIONS
Return Visit   [  X ] Physician in 6 months   [  ] In person or video visit  [  ] In person only    [ x ] After visit summary   [ X  ] Placed labs/imaging.    [ x ] Directions to 1st floor lab     It was great seeing you today!    Today we discussed your labs:  -We reviewed your results and imaging  -You currently have a high PTH with normal calcium levels  -Please complete your urine studies and I will reach out with next steps. If your studies are stable, we will plan on repeat labs in 6 months prior to our follow up  -Please continue levothyroxine 150 mcg and repeat labs today     Take care!  -Dr. Ruvalcaba

## 2024-05-15 ENCOUNTER — TELEPHONE (OUTPATIENT)
Facility: CLINIC | Age: 69
End: 2024-05-15

## 2024-05-15 PROCEDURE — 82340 ASSAY OF CALCIUM IN URINE: CPT

## 2024-05-15 PROCEDURE — 82570 ASSAY OF URINE CREATININE: CPT

## 2024-05-15 NOTE — TELEPHONE ENCOUNTER
Received call from pharmacy wanting clarification on Rx for Levothyroxine    Rx sent for 150 mcg, but reads as 175 mcg in the SIG line    RN gave verbal per lab result that Rx is meant for 150 mcg    Routed to Dr. Ruvalcaba for review.

## 2024-05-16 ENCOUNTER — LAB ENCOUNTER (OUTPATIENT)
Dept: LAB | Facility: HOSPITAL | Age: 69
End: 2024-05-16
Attending: STUDENT IN AN ORGANIZED HEALTH CARE EDUCATION/TRAINING PROGRAM

## 2024-05-16 DIAGNOSIS — E83.52 HYPERCALCEMIA: ICD-10-CM

## 2024-05-16 DIAGNOSIS — E21.0 PRIMARY HYPERPARATHYROIDISM (HCC): ICD-10-CM

## 2024-05-16 LAB
CALCIUM 24H UR-SRATE: 117 MG/24HR (ref 42–353)
CREAT UR-SCNC: 0.88 G/24 HR (ref 0.6–1.8)
SPECIMEN VOL UR: 1300 ML
SPECIMEN VOL UR: 1300 ML

## 2024-08-29 DIAGNOSIS — I26.99 PULMONARY EMBOLISM ON RIGHT (HCC): ICD-10-CM

## 2024-08-29 RX ORDER — APIXABAN 5 MG/1
5 TABLET, FILM COATED ORAL 2 TIMES DAILY
Qty: 180 TABLET | Refills: 0 | Status: SHIPPED | OUTPATIENT
Start: 2024-08-29 | End: 2024-09-04

## 2024-09-04 ENCOUNTER — OFFICE VISIT (OUTPATIENT)
Dept: HEMATOLOGY/ONCOLOGY | Facility: HOSPITAL | Age: 69
End: 2024-09-04
Attending: INTERNAL MEDICINE
Payer: MEDICARE

## 2024-09-04 VITALS
HEIGHT: 62.99 IN | HEART RATE: 87 BPM | DIASTOLIC BLOOD PRESSURE: 82 MMHG | WEIGHT: 282.5 LBS | OXYGEN SATURATION: 94 % | TEMPERATURE: 99 F | SYSTOLIC BLOOD PRESSURE: 124 MMHG | BODY MASS INDEX: 50.05 KG/M2

## 2024-09-04 DIAGNOSIS — Z79.01 PULMONARY EMBOLISM ON LONG-TERM ANTICOAGULATION THERAPY (HCC): Primary | ICD-10-CM

## 2024-09-04 DIAGNOSIS — I26.99 PULMONARY EMBOLISM ON LONG-TERM ANTICOAGULATION THERAPY (HCC): Primary | ICD-10-CM

## 2024-09-04 DIAGNOSIS — Z86.711 HISTORY OF PULMONARY EMBOLISM: ICD-10-CM

## 2024-09-04 DIAGNOSIS — Z79.01 CHRONIC ANTICOAGULATION: ICD-10-CM

## 2024-09-04 DIAGNOSIS — I26.99 PULMONARY EMBOLISM ON RIGHT (HCC): ICD-10-CM

## 2024-09-04 LAB
ALBUMIN SERPL-MCNC: 4.7 G/DL (ref 3.2–4.8)
ALBUMIN/GLOB SERPL: 1.6 {RATIO} (ref 1–2)
ALP LIVER SERPL-CCNC: 106 U/L
ALT SERPL-CCNC: 24 U/L
ANION GAP SERPL CALC-SCNC: 14 MMOL/L (ref 0–18)
AST SERPL-CCNC: 18 U/L (ref ?–34)
BASOPHILS # BLD AUTO: 0.06 X10(3) UL (ref 0–0.2)
BASOPHILS NFR BLD AUTO: 0.8 %
BILIRUB SERPL-MCNC: 0.2 MG/DL (ref 0.2–1.1)
BUN BLD-MCNC: 11 MG/DL (ref 9–23)
CALCIUM BLD-MCNC: 11.1 MG/DL (ref 8.7–10.4)
CHLORIDE SERPL-SCNC: 106 MMOL/L (ref 98–112)
CO2 SERPL-SCNC: 21 MMOL/L (ref 21–32)
CREAT BLD-MCNC: 0.85 MG/DL
EGFRCR SERPLBLD CKD-EPI 2021: 76 ML/MIN/1.73M2 (ref 60–?)
EOSINOPHIL # BLD AUTO: 0.18 X10(3) UL (ref 0–0.7)
EOSINOPHIL NFR BLD AUTO: 2.4 %
ERYTHROCYTE [DISTWIDTH] IN BLOOD BY AUTOMATED COUNT: 14.8 %
FASTING STATUS PATIENT QL REPORTED: NO
GLOBULIN PLAS-MCNC: 3 G/DL (ref 2–3.5)
GLUCOSE BLD-MCNC: 99 MG/DL (ref 70–99)
HCT VFR BLD AUTO: 38.9 %
HGB BLD-MCNC: 12.7 G/DL
IMM GRANULOCYTES # BLD AUTO: 0.06 X10(3) UL (ref 0–1)
IMM GRANULOCYTES NFR BLD: 0.8 %
LYMPHOCYTES # BLD AUTO: 2.04 X10(3) UL (ref 1–4)
LYMPHOCYTES NFR BLD AUTO: 26.9 %
MCH RBC QN AUTO: 28.4 PG (ref 26–34)
MCHC RBC AUTO-ENTMCNC: 32.6 G/DL (ref 31–37)
MCV RBC AUTO: 87 FL
MONOCYTES # BLD AUTO: 0.65 X10(3) UL (ref 0.1–1)
MONOCYTES NFR BLD AUTO: 8.6 %
NEUTROPHILS # BLD AUTO: 4.58 X10 (3) UL (ref 1.5–7.7)
NEUTROPHILS # BLD AUTO: 4.58 X10(3) UL (ref 1.5–7.7)
NEUTROPHILS NFR BLD AUTO: 60.5 %
OSMOLALITY SERPL CALC.SUM OF ELEC: 291 MOSM/KG (ref 275–295)
PLATELET # BLD AUTO: 241 10(3)UL (ref 150–450)
POTASSIUM SERPL-SCNC: 4.7 MMOL/L (ref 3.5–5.1)
PROT SERPL-MCNC: 7.7 G/DL (ref 5.7–8.2)
RBC # BLD AUTO: 4.47 X10(6)UL
SODIUM SERPL-SCNC: 141 MMOL/L (ref 136–145)
WBC # BLD AUTO: 7.6 X10(3) UL (ref 4–11)

## 2024-09-04 PROCEDURE — 99214 OFFICE O/P EST MOD 30 MIN: CPT | Performed by: INTERNAL MEDICINE

## 2024-09-04 NOTE — PROGRESS NOTES
Hematology Clinic Follow Up Note    Patient Name: Giuliana Flanagan  Medical Record Number: [id number]   YOB: 1955    PCP: Dr. Kaylene Powell    Reason for Consultation:  Giuliana Flanagan was seen today for the diagnosis of pulmonary embolism    Hematologic History:  *Acute pulmonary embolism- unprovoked, idiopathic  --9/25/15 presented to [facility] ER with acute pleuritic chest pain-> CTA chest showed right segmental PE   -started on LMWH in hospital   -discharged on eliquis   -thrombophilia evaluation including FVL, PT mutation testing, APLA testing, protein C, protein S, and antithrombin activities were all unremarkable.   -continued chronic eliquis    ============================  Interval events: Remains on eliquis 5mg BID without any issues, no bleeding or excessive bruising.  Mild chronic BLE swelling is stable without recent change. No significant leg pains other than her knees.  She denies any dyspnea or chest pain.      She has been having diarrhea 4-5 times per day since she was hospitalized in July for apparent accidental opiate overdose.  Stool WBCs and O&P were negative as ordered by her PCP.  Still has ongoing diarrhea but has not followed up.  No fevers or dark stools.    Past Medical History:  Past Medical History:    Chronic back pain    GERD (gastroesophageal reflux disease)    Hypothyroidism    Incidental pulmonary nodule, > 3mm and < 8mm    Obesity    SANG on CPAP    PE (pulmonary embolism)    Pulmonary embolism on right (HCC)      Past Surgical History:   Procedure Laterality Date    Back surgery      L3-4 fusion          x 2    Cholecystectomy      Hysterectomy       Home Medications:   ELIQUIS 5 MG Oral Tab Take 1 tablet (5 mg total) by mouth 2 (two) times daily. 180 tablet 0    levothyroxine 150 MCG Oral Tab Take 1 tablet (150 mcg total) by mouth before breakfast. 175 mcg 90 tablet 1    clotrimazole-betamethasone 1-0.05 % External Cream Apply 1 Application topically 3  (three) times daily.      XTAMPZA ER 13.5 MG Oral Capsule Extended Release 12 hour Abuse-Deterrent Take 1 capsule by mouth 2 (two) times daily.      Ergocalciferol (VITAMIN D OR) Take by mouth.      Ascorbic Acid (VITAMIN C OR) Take by mouth.      Multiple Vitamins-Minerals (ZINC OR) Take by mouth.      Omeprazole Magnesium 20 MG Oral Tab EC Take 1 tablet (20 mg total) by mouth daily.      gabapentin 800 MG Oral Tab Take 1 tablet (800 mg total) by mouth 3 (three) times daily.      oxyCODONE-acetaminophen  MG Oral Tab Take 1 tablet by mouth every 8 (eight) hours as needed for Pain.      Meclizine HCl (ANTIVERT) 25 MG Oral Tab Take 1 tablet by mouth 4 (four) times daily as needed for Dizziness. 20 tablet 0     Allergies:   No Known Allergies    Psychosocial History:  Social History     Social History Narrative    .  No longer working     Social History     Socioeconomic History    Marital status:    Occupational History     Employer: [employer]   Tobacco Use    Smoking status: Former     Current packs/day: 0.00     Average packs/day: 0.3 packs/day for 45.0 years (11.3 ttl pk-yrs)     Types: Cigarettes     Start date: 1970     Quit date: 2015     Years since quittin.9    Smokeless tobacco: Never   Substance and Sexual Activity    Alcohol use: Yes     Alcohol/week: 0.0 standard drinks of alcohol     Comment: occasional use, no history of excessive use    Drug use: No     Types: Oxycodone   Social History Narrative    .  No longer working     Social Determinants of Health     Food Insecurity: Low Risk  (7/3/2024)    Received from [facility]    Food Insecurity     Have there been times that your food ran out, and you didn't have money to get more?: No     Are there times that you worry that this might happen?: No   Transportation Needs: Low Risk  (7/3/2024)    Received from [facility]    Transportation Needs     Do you have trouble  getting transportation to medical appointments?: No    [facility]    Social Connections   Housing Stability: Low Risk  (7/3/2024)    Received from [facility]    Housing Stability     Are you worried that your electric, gas, oil, or water might be shut off?: No     Are you concerned about having a safe and reliable place to live?: No     Family Medical History:  Family History   Problem Relation Age of Onset    Bleeding Disorders Neg     Blood Disorder Neg     Cancer Neg      Review of Systems:  A 10-point ROS was done with pertinent positives and negative per the HPI    Vital Signs:  Height: 160 cm (5' 2.99\") (09/04 1542)  Weight: 128.1 kg (282 lb 8 oz) (09/04 1542)  BSA (Calculated - sq m): 2.24 sq meters (09/04 1542)  Pulse: 87 (09/04 1542)  BP: 124/82 (09/04 1542)  Temp: 99 °F (37.2 °C) (09/04 1542)  Do Not Use - Resp Rate: --  SpO2: 94 % (09/04 1542)    Wt Readings from Last 6 Encounters:   09/04/24 128.1 kg (282 lb 8 oz)   03/04/24 123.8 kg (273 lb)   02/20/24 122.5 kg (270 lb)   02/28/22 128.6 kg (283 lb 8 oz)   08/26/21 128.8 kg (284 lb)   02/25/21 129.3 kg (285 lb)     Physical Examination:  General: Patient is alert and oriented, not in acute distress  Psych: Mood and affect are appropriate  Eyes: EOMI  ENT: Oropharynx is clear.   CV: Regular rate and rhythm, no murmurs  Respiratory: Lungs clear to auscultation bilaterally  GI/Abd: Soft, non-tender with normoactive bowel sounds, no hepatosplenomegaly  Neurological: Grossly intact   Lymphatics: No palpable lymphadenopathy  Skin: no rashes or petechiae  Ext: trace symmetric BLE edema    Laboratory:  Lab Results   Component Value Date    WBC 7.6 09/04/2024    WBC 7.5 03/04/2024    WBC 6.5 02/28/2022    HGB 12.7 09/04/2024    HGB 13.3 03/04/2024    HGB 12.6 02/28/2022    HCT 38.9 09/04/2024    MCV 87.0 09/04/2024    MCH 28.4 09/04/2024    MCHC 32.6 09/04/2024    RDW 14.8  09/04/2024    .0 09/04/2024    .0 03/04/2024    .0 02/28/2022     Lab Results   Component Value Date    GLU 99 09/04/2024    BUN 11 09/04/2024    BUNCREA 14.1 04/15/2024    CREATSERUM 0.85 09/04/2024    CREATSERUM 0.78 04/15/2024    CREATSERUM 0.85 03/07/2024    ANIONGAP 14 09/04/2024    GFR 93 01/04/2018    GFRNAA 68 02/28/2022    GFRAA 78 02/28/2022    CA 11.1 (H) 09/04/2024    OSMOCALC 291 09/04/2024    ALKPHO 106 09/04/2024    AST 18 09/04/2024    ALT 24 09/04/2024    BILT 0.2 09/04/2024    TP 7.7 09/04/2024    ALB 4.7 09/04/2024    GLOBULIN 3.0 09/04/2024     09/04/2024    K 4.7 09/04/2024     09/04/2024    CO2 21.0 09/04/2024     No results found for: \"PTT\", \"PT\", \"INR\"    Impression & Plan:     *pulmonary embolism  -unprovoked, idiopathic.  Thrombophilia evaluation was negative.   -we have previously discussed that given the unprovoked nature of her thrombosis, she has a relatively high risk for recurrence.   -Given her weight remains >120 kg  I favor dosing Eliquis at 5mg BID.  If she can lose weight and get <120kg we would switch to eliquis 2.5mg BID which may have a lower bleeding risk.    -repeat CBC and CMP q6 months while on anticoagulation    *Smoking cessation  -stopped 9/23/15- encouraged continued abstinence     *Hypercalcemia due to hyperparathyroidism  -labs show persistently elevated calcium level with elevated PTH.  She established with endocrinology for this.  She has follow-up with them in a couple months.      *Diarrhea  -Recommend she follow-up with her PCP or GI for further evaluation    RTC in 6 months    Bud Butts MD  Hematology/Medical Oncology  [facility]

## 2024-09-04 NOTE — PROGRESS NOTES
Education Record     Learner:  Patient     Disease / Diagnosis:PE     Barriers / Limitations:  None                Comments:     Method:  Discussion                Comments:     General Topics:  Plan of care reviewed                Comments:     Outcome:  Shows understanding                Comments: 6 month f/up. Pt states she has been taking eliquis without complications.

## 2024-11-13 ENCOUNTER — LAB ENCOUNTER (OUTPATIENT)
Dept: LAB | Age: 69
End: 2024-11-13
Attending: STUDENT IN AN ORGANIZED HEALTH CARE EDUCATION/TRAINING PROGRAM
Payer: MEDICARE

## 2024-11-13 DIAGNOSIS — E06.3 HYPOTHYROIDISM DUE TO HASHIMOTO'S THYROIDITIS: ICD-10-CM

## 2024-11-13 DIAGNOSIS — E21.0 PRIMARY HYPERPARATHYROIDISM (HCC): ICD-10-CM

## 2024-11-13 LAB
ALBUMIN SERPL-MCNC: 4.3 G/DL (ref 3.2–4.8)
ANION GAP SERPL CALC-SCNC: 3 MMOL/L (ref 0–18)
BUN BLD-MCNC: 12 MG/DL (ref 9–23)
CALCIUM BLD-MCNC: 11 MG/DL (ref 8.7–10.4)
CHLORIDE SERPL-SCNC: 106 MMOL/L (ref 98–112)
CO2 SERPL-SCNC: 30 MMOL/L (ref 21–32)
CREAT BLD-MCNC: 0.87 MG/DL
EGFRCR SERPLBLD CKD-EPI 2021: 73 ML/MIN/1.73M2 (ref 60–?)
GLUCOSE BLD-MCNC: 117 MG/DL (ref 70–99)
OSMOLALITY SERPL CALC.SUM OF ELEC: 289 MOSM/KG (ref 275–295)
PHOSPHATE SERPL-MCNC: 3.7 MG/DL (ref 2.4–5.1)
POTASSIUM SERPL-SCNC: 4.2 MMOL/L (ref 3.5–5.1)
PTH-INTACT SERPL-MCNC: 125.1 PG/ML (ref 18.5–88)
SODIUM SERPL-SCNC: 139 MMOL/L (ref 136–145)
T4 FREE SERPL-MCNC: 1.4 NG/DL (ref 0.8–1.7)
TSI SER-ACNC: 1.89 UIU/ML (ref 0.55–4.78)
VIT D+METAB SERPL-MCNC: 24.4 NG/ML (ref 30–100)

## 2024-11-13 PROCEDURE — 82306 VITAMIN D 25 HYDROXY: CPT

## 2024-11-13 PROCEDURE — 80069 RENAL FUNCTION PANEL: CPT

## 2024-11-13 PROCEDURE — 36415 COLL VENOUS BLD VENIPUNCTURE: CPT

## 2024-11-13 PROCEDURE — 83970 ASSAY OF PARATHORMONE: CPT

## 2024-11-13 PROCEDURE — 84443 ASSAY THYROID STIM HORMONE: CPT

## 2024-11-13 PROCEDURE — 84439 ASSAY OF FREE THYROXINE: CPT

## 2024-11-14 ENCOUNTER — OFFICE VISIT (OUTPATIENT)
Facility: CLINIC | Age: 69
End: 2024-11-14
Payer: MEDICARE

## 2024-11-14 VITALS
WEIGHT: 278 LBS | HEIGHT: 63 IN | HEART RATE: 128 BPM | OXYGEN SATURATION: 97 % | SYSTOLIC BLOOD PRESSURE: 118 MMHG | BODY MASS INDEX: 49.26 KG/M2 | DIASTOLIC BLOOD PRESSURE: 74 MMHG

## 2024-11-14 DIAGNOSIS — E21.0 PRIMARY HYPERPARATHYROIDISM (HCC): Primary | ICD-10-CM

## 2024-11-14 DIAGNOSIS — E06.3 HYPOTHYROIDISM DUE TO HASHIMOTO'S THYROIDITIS: ICD-10-CM

## 2024-11-14 PROCEDURE — 99214 OFFICE O/P EST MOD 30 MIN: CPT | Performed by: STUDENT IN AN ORGANIZED HEALTH CARE EDUCATION/TRAINING PROGRAM

## 2024-11-14 RX ORDER — LEVOTHYROXINE SODIUM 175 UG/1
175 TABLET ORAL DAILY
Qty: 90 TABLET | Refills: 1 | Status: SHIPPED | OUTPATIENT
Start: 2024-11-14

## 2024-11-14 NOTE — PATIENT INSTRUCTIONS
Visit Summary  We reviewed your labs and discussed starting vitamin D 2000 units daily  Continue your dietary calcium intake  Repeat your labs in 6 months prior to our follow up  Continue your thyroid medication    General follow up information:  Please let us know if you require any refills at least 1 week prior to your medication running out. If you do run out of medication, please call our office ASAP to request refills (do not wait until your follow up).  Please call us if you experience any problems with insurance coverage of medication, lab work, or imaging.   The on-call pager is for urgent matters only. If you are a type 1 diabetic and run out of insulin after business hours 8AM-4PM, you may call the on-call pager for a refill to a 24 hour pharmacy. If you have adrenal insufficiency and run out of steroids, you may call the on-call pager for a refill to a 24 hours pharmacy. All other refill requests should be requested during business hours.    Return Visit   [x]   Dr. Ruvalcaba in 6 months   [] Virtual visit  [] No follow up appointment needed  [] Follow up to be scheduled pending      []  Fasting/8AM labs  []  Central scheduling # for ultrasound/nuclear med/CT/MRI/DXA/IR  []  Provide flyer for:  [] ENT   [] Weight Management  [] Infertility/Reproductive Endocrinology  [] Transgender care  []  Directions to 1st floor lab  [] Collect urine collection jug  [] Collect salivary cortisol tubes  []  Dental clearance form  []  Will need authorization for outside records

## 2024-11-14 NOTE — PROGRESS NOTES
ENDOCRINOLOGY, DIABETES, & METABOLISM NOTE     Subjective:   Giuliana Flanagan is a 69 year old female who presents for hypercalcemia.    Initial HPI consult in 4/2024    First noted hypercalcemia on lab work since 2022    -12/12/2022 Ca 10.5 with albumin 4.2  Fhx of hyperCa or MEN: denies  Hx of stones: denies  Hx of fragility fractures or osteoporosis: denies; no previous bone density  Medications (thiazides that actively resorb Ca, lithium, steroids, Forteo, tums, Ca supp): denies. Was on medrol dose pack but states this was for a short period of time  Diet (excessive milk/calcium): denies   Surgeries/immobilization: denies  Any hx of malignancies: denies; diagnosed with a pulmonary embolism around 2015.  Thrombophilia evaluation including FVL, PT mutation testing, APLA testing, protein C, protein S, and antithrombin activities were all unremarkable.     Hx of hypothyroidism, diagnosed many years ago. Currently on LT4 150 mcg daily. Did stop taking it for 3-4 weeks around 12/2023 and restarted around end of March 2024.     Interval history 5/2024  Currently on vitamin D 2000 units since 4/15/2024   Denies any falls or fractures since last visit  Urine studies ordered however patient was unable to complete them before this visit  Continues on levothyroxine 150 mcg daily, takes it appropriately     Interval Hx 11/14/24  Labs: 5/2024 1.3 L urine calcium 117 7/2024 TSH 1.3, total calcium 9.6, albumin 4.2 11/2024 , vitamin D 24.4, total calcium 11, albumin 4.3, phosphorus 3.7, TSH 1.8, free T41.4  No falls or fractures since LOV   Denies kidney stones  Has not started on calcium, does have mild dietary intake   Currently on LT4 175 mcg daily       History/Other:    No Further Nursing Notes to Review         Tobacco:  She smoked tobacco in the past but quit greater than 12 months ago.  Social History     Tobacco Use   Smoking Status Former    Current packs/day: 0.00    Average packs/day: 0.3 packs/day for 45.0  years (11.3 ttl pk-yrs)    Types: Cigarettes    Start date: 1970    Quit date: 2015    Years since quittin.1   Smokeless Tobacco Never        Current Outpatient Medications   Medication Sig Dispense Refill    SEMAGLUTIDE-WEIGHT MANAGEMENT SC Inject 0.5 mg into the skin every 7 days.      levothyroxine 175 MCG Oral Tab Take 1 tablet (175 mcg total) by mouth daily.      aspirin 81 MG Oral Tab EC Take 1 tablet (81 mg total) by mouth daily.      apixaban (ELIQUIS) 5 MG Oral Tab Take 1 tablet (5 mg total) by mouth 2 (two) times daily. 180 tablet 1    Multiple Vitamins-Minerals (ZINC OR) Take by mouth.      gabapentin 800 MG Oral Tab Take 1 tablet (800 mg total) by mouth 3 (three) times daily.       No Known Allergies  Past Medical History:    Arthritis    Chronic back pain    GERD (gastroesophageal reflux disease)    Hypothyroidism    Incidental pulmonary nodule, > 3mm and < 8mm    Obesity    SANG on CPAP    PE (pulmonary embolism)    Pulmonary embolism on right (HCC)    Sleep apnea      Past Surgical History:   Procedure Laterality Date    Back surgery      L3-4 fusion          x 2    Cholecystectomy      Hysterectomy      Tonsillectomy       Social History     Socioeconomic History    Marital status:    Occupational History     Employer: [employer]   Tobacco Use    Smoking status: Former     Current packs/day: 0.00     Average packs/day: 0.3 packs/day for 45.0 years (11.3 ttl pk-yrs)     Types: Cigarettes     Start date: 1970     Quit date: 2015     Years since quittin.1    Smokeless tobacco: Never   Vaping Use    Vaping status: Never Used   Substance and Sexual Activity    Alcohol use: Not Currently     Comment: occasional use, no history of excessive use    Drug use: No     Types: Oxycodone    Sexual activity: Not Currently   Social History Narrative    .  No longer working     Social Drivers of Health     Food Insecurity: Low Risk  (7/3/2024)    Received from  [facility]    Food Insecurity     Have there been times that your food ran out, and you didn't have money to get more?: No     Are there times that you worry that this might happen?: No   Transportation Needs: Low Risk  (7/3/2024)    Received from [facility]    Transportation Needs     Do you have trouble getting transportation to medical appointments?: No    Received from [facility], [facility]    Social Connections   Housing Stability: Low Risk  (7/3/2024)    Received from [facility]    Housing Stability     Are you worried that your electric, gas, oil, or water might be shut off?: No     Are you concerned about having a safe and reliable place to live?: No     Family History   Problem Relation Age of Onset    Bleeding Disorders Neg     Blood Disorder Neg     Cancer Neg          Review of Systems:  10 point ROS completed, refer to HPI for pertinent positives    Objective:   /74   Pulse (!) 128   Ht 5' 3\" (1.6 m)   Wt 278 lb (126.1 kg)   SpO2 97%   BMI 49.25 kg/m²  Estimated body mass index is 49.25 kg/m² as calculated from the following:    Height as of this encounter: 5' 3\" (1.6 m).    Weight as of this encounter: 278 lb (126.1 kg).  General Appearance:  Alert, in no acute distress, well developed  Eyes:  normal conjunctivae, sclera  Ears/Nose/Mouth/Throat/Neck:  normal hearing, normal speech and no palpable thyroid nodules  Respiratory:  breathing comfortably on room air, clear to auscultation bilaterally  Cardiovascular:  regular rate and rhythm, no murmurs, S3 or S4   Psychiatric:  Oriented to person, place and time, appropriate mood & affect  Skin: Normal moisture and skin texture  Neuro: sensory grossly intact, motor grossly intact. normal gait.      Laboratory Data   Recent Labs: Labs reviewed in [facility] under lab tab on 11/14/2024. Interpretation: hypercalcemia with hyperparathyroidism 3/2024    4/2024 labs with elevated PTH and calcium within range, vitamin D less than 30, CTX stable    Lab Results   Component Value Date    .1 (H) 11/13/2024    .4 (H) 04/15/2024    .0 (H) 03/07/2024    CA 11.0 (H) 11/13/2024    CA 11.1 (H) 09/04/2024    CA 9.9 04/15/2024    CA 10.3 (H) 03/07/2024    CA 10.7 (H) 03/04/2024    CA 9.9 02/28/2022    CREATSERUM 0.87 11/13/2024    CREATSERUM 0.85 09/04/2024    CREATSERUM 0.78 04/15/2024    PHOS 3.7 11/13/2024    PHOS 3.5 04/15/2024    PHOS 3.3 03/07/2024    MG 2.2 04/15/2024    VITD 24.4 (L) 11/13/2024    VITD 24.5 (L) 04/15/2024         Component      Latest Ref Rng 4/15/2024   BUN/CREATININE RATIO      10.0 - 20.0  14.1    CALCIUM      8.7 - 10.4 mg/dL 9.9    CALCULATED OSMOLALITY      275 - 295 mOsm/kg 298 (H)    EGFR      >=60 mL/min/1.73m2 84    Albumin      3.2 - 4.8 g/dL 4.3    PHOSPHORUS      2.4 - 5.1 mg/dL 3.5    VITAMIN D, 25-OH, TOTAL      30.0 - 100.0 ng/mL 24.5 (L)    PTH INTACT      18.5 - 88.0 pg/mL 117.4 (H)    CALCIUM, IONIZED      4.5 - 5.6 mg/dL 5.1    Magnesium, Serum      1.6 - 2.6 mg/dL 2.2    C-TELOPEPTIDE (S)      pg/mL 366              Component  Ref Range & Units 3 wk ago 4 wk ago 2 yr ago   Creatinine  0.55 - 1.02 mg/dL 0.85 0.96 0.90   Calcium, Total  8.5 - 10.1 mg/dL 10.3 10.7 9.9   Phosphorus  2.5 - 4.9 mg/dL 3.3     Pth Intact  18.5 - 88.0 pg/mL 122.0     3/20/2024 TSH 9.34 with free T40.69      Imaging   Radiology: Personally reviewed pertinent imaging  I reviewed the patient's records from outside facility: No bone density on file       DXA:  Date L2-L4 BMD T-score % change Left femoral Neck BMD T-score % change   4/11/2024 BK 1.084 0.6 0.818 -0.3               ASSESSMENT/PLAN   Assessment & Plan:     Giuliana Flanagan is a 69 year old female who presented to clinic for:    1. Primary hyperparathyroidism (HCC) (Primary)    -Discussed the pathophysiology and natural course of PHPT, reviewed medical and surgical options  (including no intervention, just monitoring).   -  based on initial labs and clinical presentation, PTH inappropriately normal or mildly elevated: primary hyperPTH (10-20% of pts) vs FHH; this is likely early primary hyper para versus normocalcemic hyperparathyroidism  - pt is not on Ca-inducing medications, no excessive ca intake   - Cr wnl, not causing secondary hyperPTH  - no Fhx of hyperCa/hyperPTH; no hx of fragility fx or kidney stones   -Initial workup in 2024 with .4, vitamin D 24.5, calcium 9.9, albumin 4.3, , Phos 3.5, mag 2.2  -5/2024 1.3 L urine calcium 117   -11/2024 , vitamin D 24.4, total calcium 11, albumin 4.3, phosphorus 3.7   -4/11/2024 bone density without osteopenia or osteoporosis  - Discussed medical vs surgical mgmt ; patient does not currently meet criteria for surgery  Plan   -Continue vitamin D 2000 units daily and dietary calcium intake  -Repeat labs in 6 months    2. Hypothyroidism due to Hashimoto's thyroiditis    Lab Results   Component Value Date    TSH 1.887 11/13/2024    T4F 1.4 11/13/2024      -Patient endorses compliance with levothyroxine 175 mcg daily and states she takes it appropriately  -Patient to repeat labs in 6 months prior to follow-up visit         Return in about 6 months (around 5/14/2025).    The above plan was discussed in detail with the patient who verbalized understanding and agreement.      Sangita Ruvalcaba DO  [facility]  11/14/2024     In reviewing this note, please be advised that Dragon Voice Recognition software used to dictate the note may have made errors in recognizing some of the words or phrases.     Note to patient: The 21 Century Cures Act makes medical notes like these available to patients in the interest of transparency. However, be advised this is a medical document. It is intended as peer to peer communication. It is written in medical language and may contain abbreviations or verbiage that are unfamiliar. It may  appear blunt or direct. Medical documents are intended to carry relevant information, facts as evident, and the clinical opinion of the practitioner.

## 2025-03-03 ENCOUNTER — TELEPHONE (OUTPATIENT)
Age: 70
End: 2025-03-03

## 2025-03-03 ENCOUNTER — APPOINTMENT (OUTPATIENT)
Age: 70
End: 2025-03-03
Attending: INTERNAL MEDICINE
Payer: MEDICARE

## 2025-03-03 NOTE — TELEPHONE ENCOUNTER
Pt was scheduled for visit today and cancel due to not feeling well. States been throwing up and nausea. NO other symptoms.

## 2025-05-14 DIAGNOSIS — E06.3 HYPOTHYROIDISM DUE TO HASHIMOTO'S THYROIDITIS: Primary | ICD-10-CM

## 2025-05-15 NOTE — TELEPHONE ENCOUNTER
Phoned patient to verify the dose for Levothyroxine to no answer, left voicemail to call back. Sent [employer] message.     11/14/2024:   \"Continues on levothyroxine 150 mcg daily, takes it appropriately \"  \"-Patient endorses compliance with levothyroxine 175 mcg daily \"    Routed to the provider for review.    Endocrine refill protocol for medications for hypothyroidism and hyperthyroidism    Protocol Criteria:  PASSED Reason: N/A    If all below requirements are met, send a 90-day supply with 1 refill per provider protocol.    Verify appointment with Endocrinology completed in the last 12 months or scheduled in the next 6 months.    Normal TSH result in the past 12 months   Review recent telephone encounters and [employer] communications with patient to ensure a dose change has not occurred since last office visit that was not updated in the medication history list     Last completed office visit:11/14/2024 Sangita Ruvalcaba DO   Last completed telemed visit: Visit date not found  Next scheduled Follow up:   Future Appointments   Date Time Provider Department Center   6/5/2025  3:00 PM Sangita Ruvalcaba DO [id number] [facility]      Last TSH result:   TSH   Date Value Ref Range Status   11/13/2024 1.887 0.550 - 4.780 uIU/mL Final

## 2025-05-19 RX ORDER — LEVOTHYROXINE SODIUM 175 UG/1
175 TABLET ORAL DAILY
Qty: 90 TABLET | Refills: 1 | Status: SHIPPED | OUTPATIENT
Start: 2025-05-19

## 2025-05-19 NOTE — TELEPHONE ENCOUNTER
Phoned patient to verify Levothyroxine dose, per patient she is currently taking Levothyroxine 175mcg daily. Routed for review.

## 2025-05-19 NOTE — TELEPHONE ENCOUNTER
Endocrine refill protocol for medications for hypothyroidism and hyperthyroidism    Protocol Criteria:  PASSED Reason: N/A    If all below requirements are met, send a 90-day supply with 1 refill per provider protocol.    Verify appointment with Endocrinology completed in the last 12 months or scheduled in the next 6 months.    Normal TSH result in the past 12 months   Review recent telephone encounters and mychart communications with patient to ensure a dose change has not occurred since last office visit that was not updated in the medication history list     Last completed office visit:11/14/2024 Sangita Ruvalcaba DO   Last completed telemed visit: Visit date not found  Next scheduled Follow up:   Future Appointments   Date Time Provider Department Center   6/5/2025  3:00 PM Sangita Ruvalcaba DO [id number] [facility]      Last TSH result:   TSH   Date Value Ref Range Status   11/13/2024 1.887 0.550 - 4.780 uIU/mL Final   Denied as pended refill request waiting for patient to respond to current dosage.

## 2025-05-19 NOTE — TELEPHONE ENCOUNTER
At November 2024 follow-up she was taking levothyroxine 175 mcg daily.  Please let me know if this is still the case and I can refill her medication.  Thank you

## 2025-05-21 RX ORDER — LEVOTHYROXINE SODIUM 175 UG/1
175 TABLET ORAL DAILY
Qty: 90 TABLET | Refills: 0 | OUTPATIENT
Start: 2025-05-21

## 2025-06-13 DIAGNOSIS — I26.99 PULMONARY EMBOLISM ON RIGHT (HCC): ICD-10-CM

## 2025-06-13 RX ORDER — APIXABAN 5 MG/1
5 TABLET, FILM COATED ORAL 2 TIMES DAILY
Qty: 180 TABLET | Refills: 0 | Status: SHIPPED | OUTPATIENT
Start: 2025-06-13

## (undated) NOTE — MR AVS SNAPSHOT
After Visit Summary   2/6/2017    Estephania Knight    MRN: [id number]           Diagnoses this Visit     Incidental pulmonary nodule, > 3mm and < 8mm    -  Primary       Allergies     No Known Allergies      Your Vital Signs Were     BP Pulse Temp CBC W/ DIFFERENTIAL[[id number]]          Abnormal            Final result                 Please view results for these tests on the individual orders.          Result Summary for CBC W/ DIFFERENTIAL      Component Results     Component Value Flag Ref Range Support Staff. Remember, MyChart is NOT to be used for urgent needs. For medical emergencies, dial 911.